# Patient Record
Sex: FEMALE | Race: WHITE | NOT HISPANIC OR LATINO
[De-identification: names, ages, dates, MRNs, and addresses within clinical notes are randomized per-mention and may not be internally consistent; named-entity substitution may affect disease eponyms.]

---

## 2021-06-07 ENCOUNTER — APPOINTMENT (OUTPATIENT)
Dept: OBGYN | Facility: HOSPITAL | Age: 86
End: 2021-06-07

## 2021-06-07 ENCOUNTER — APPOINTMENT (OUTPATIENT)
Dept: SURGERY | Facility: HOSPITAL | Age: 86
End: 2021-06-07

## 2021-07-13 ENCOUNTER — NON-APPOINTMENT (OUTPATIENT)
Age: 86
End: 2021-07-13

## 2021-07-13 ENCOUNTER — APPOINTMENT (OUTPATIENT)
Dept: OPHTHALMOLOGY | Facility: CLINIC | Age: 86
End: 2021-07-13
Payer: MEDICARE

## 2021-07-13 PROCEDURE — 99072 ADDL SUPL MATRL&STAF TM PHE: CPT

## 2021-07-13 PROCEDURE — 76514 ECHO EXAM OF EYE THICKNESS: CPT

## 2021-07-13 PROCEDURE — 92004 COMPRE OPH EXAM NEW PT 1/>: CPT

## 2021-07-13 PROCEDURE — 92020 GONIOSCOPY: CPT

## 2021-09-03 ENCOUNTER — TRANSCRIPTION ENCOUNTER (OUTPATIENT)
Age: 86
End: 2021-09-03

## 2021-10-19 ENCOUNTER — APPOINTMENT (OUTPATIENT)
Dept: OPHTHALMOLOGY | Facility: CLINIC | Age: 86
End: 2021-10-19
Payer: MEDICARE

## 2021-10-19 ENCOUNTER — NON-APPOINTMENT (OUTPATIENT)
Age: 86
End: 2021-10-19

## 2021-10-19 PROCEDURE — 92014 COMPRE OPH EXAM EST PT 1/>: CPT

## 2022-01-28 ENCOUNTER — NON-APPOINTMENT (OUTPATIENT)
Age: 87
End: 2022-01-28

## 2022-01-28 ENCOUNTER — APPOINTMENT (OUTPATIENT)
Dept: OPHTHALMOLOGY | Facility: CLINIC | Age: 87
End: 2022-01-28
Payer: MEDICARE

## 2022-01-28 PROCEDURE — 92012 INTRM OPH EXAM EST PATIENT: CPT

## 2022-03-25 ENCOUNTER — NON-APPOINTMENT (OUTPATIENT)
Age: 87
End: 2022-03-25

## 2022-03-25 ENCOUNTER — APPOINTMENT (OUTPATIENT)
Dept: OPHTHALMOLOGY | Facility: CLINIC | Age: 87
End: 2022-03-25
Payer: MEDICARE

## 2022-03-25 PROCEDURE — 92012 INTRM OPH EXAM EST PATIENT: CPT

## 2022-05-04 ENCOUNTER — APPOINTMENT (OUTPATIENT)
Dept: OPHTHALMOLOGY | Facility: CLINIC | Age: 87
End: 2022-05-04
Payer: MEDICARE

## 2022-05-04 ENCOUNTER — NON-APPOINTMENT (OUTPATIENT)
Age: 87
End: 2022-05-04

## 2022-05-04 PROCEDURE — 92012 INTRM OPH EXAM EST PATIENT: CPT

## 2022-07-08 ENCOUNTER — APPOINTMENT (OUTPATIENT)
Dept: OPHTHALMOLOGY | Facility: CLINIC | Age: 87
End: 2022-07-08

## 2022-10-11 ENCOUNTER — APPOINTMENT (OUTPATIENT)
Dept: OPHTHALMOLOGY | Facility: CLINIC | Age: 87
End: 2022-10-11

## 2022-11-22 PROBLEM — Z00.00 ENCOUNTER FOR PREVENTIVE HEALTH EXAMINATION: Status: ACTIVE | Noted: 2022-11-22

## 2022-11-29 ENCOUNTER — APPOINTMENT (OUTPATIENT)
Dept: OPHTHALMOLOGY | Facility: CLINIC | Age: 87
End: 2022-11-29

## 2022-11-29 ENCOUNTER — NON-APPOINTMENT (OUTPATIENT)
Age: 87
End: 2022-11-29

## 2022-11-29 PROCEDURE — 92012 INTRM OPH EXAM EST PATIENT: CPT

## 2023-01-24 ENCOUNTER — NON-APPOINTMENT (OUTPATIENT)
Age: 88
End: 2023-01-24

## 2023-01-24 ENCOUNTER — APPOINTMENT (OUTPATIENT)
Dept: OPHTHALMOLOGY | Facility: CLINIC | Age: 88
End: 2023-01-24
Payer: MEDICARE

## 2023-01-24 PROCEDURE — 92012 INTRM OPH EXAM EST PATIENT: CPT

## 2023-06-06 ENCOUNTER — NON-APPOINTMENT (OUTPATIENT)
Age: 88
End: 2023-06-06

## 2023-06-06 ENCOUNTER — APPOINTMENT (OUTPATIENT)
Dept: OPHTHALMOLOGY | Facility: CLINIC | Age: 88
End: 2023-06-06
Payer: MEDICARE

## 2023-06-06 PROCEDURE — 92012 INTRM OPH EXAM EST PATIENT: CPT

## 2023-12-05 ENCOUNTER — NON-APPOINTMENT (OUTPATIENT)
Age: 88
End: 2023-12-05

## 2023-12-05 ENCOUNTER — APPOINTMENT (OUTPATIENT)
Dept: OPHTHALMOLOGY | Facility: CLINIC | Age: 88
End: 2023-12-05
Payer: MEDICARE

## 2023-12-05 PROCEDURE — 92012 INTRM OPH EXAM EST PATIENT: CPT

## 2024-06-04 ENCOUNTER — NON-APPOINTMENT (OUTPATIENT)
Age: 89
End: 2024-06-04

## 2024-06-04 ENCOUNTER — APPOINTMENT (OUTPATIENT)
Dept: OPHTHALMOLOGY | Facility: CLINIC | Age: 89
End: 2024-06-04
Payer: MEDICARE

## 2024-06-04 PROCEDURE — 92014 COMPRE OPH EXAM EST PT 1/>: CPT

## 2025-01-14 ENCOUNTER — APPOINTMENT (OUTPATIENT)
Dept: OPHTHALMOLOGY | Facility: CLINIC | Age: 89
End: 2025-01-14

## 2025-02-12 ENCOUNTER — APPOINTMENT (OUTPATIENT)
Dept: OPHTHALMOLOGY | Facility: CLINIC | Age: 89
End: 2025-02-12

## 2025-05-02 ENCOUNTER — INPATIENT (INPATIENT)
Facility: HOSPITAL | Age: 89
LOS: 1 days | Discharge: ANOTHER HOSPITAL | DRG: 534 | End: 2025-05-04
Attending: STUDENT IN AN ORGANIZED HEALTH CARE EDUCATION/TRAINING PROGRAM | Admitting: STUDENT IN AN ORGANIZED HEALTH CARE EDUCATION/TRAINING PROGRAM
Payer: MEDICARE

## 2025-05-02 VITALS
DIASTOLIC BLOOD PRESSURE: 60 MMHG | OXYGEN SATURATION: 98 % | TEMPERATURE: 98 F | HEIGHT: 66 IN | RESPIRATION RATE: 17 BRPM | HEART RATE: 60 BPM | WEIGHT: 119.93 LBS | SYSTOLIC BLOOD PRESSURE: 148 MMHG

## 2025-05-02 DIAGNOSIS — I10 ESSENTIAL (PRIMARY) HYPERTENSION: ICD-10-CM

## 2025-05-02 DIAGNOSIS — S72.492A OTHER FRACTURE OF LOWER END OF LEFT FEMUR, INITIAL ENCOUNTER FOR CLOSED FRACTURE: ICD-10-CM

## 2025-05-02 DIAGNOSIS — S72.92XA UNSPECIFIED FRACTURE OF LEFT FEMUR, INITIAL ENCOUNTER FOR CLOSED FRACTURE: ICD-10-CM

## 2025-05-02 DIAGNOSIS — Z87.81 PERSONAL HISTORY OF (HEALED) TRAUMATIC FRACTURE: Chronic | ICD-10-CM

## 2025-05-02 DIAGNOSIS — Z29.9 ENCOUNTER FOR PROPHYLACTIC MEASURES, UNSPECIFIED: ICD-10-CM

## 2025-05-02 DIAGNOSIS — I48.92 UNSPECIFIED ATRIAL FLUTTER: ICD-10-CM

## 2025-05-02 DIAGNOSIS — H40.9 UNSPECIFIED GLAUCOMA: ICD-10-CM

## 2025-05-02 LAB
ALBUMIN SERPL ELPH-MCNC: 3.1 G/DL — LOW (ref 3.3–5)
ALP SERPL-CCNC: 66 U/L — SIGNIFICANT CHANGE UP (ref 40–120)
ALT FLD-CCNC: 20 U/L — SIGNIFICANT CHANGE UP (ref 12–78)
ANION GAP SERPL CALC-SCNC: 9 MMOL/L — SIGNIFICANT CHANGE UP (ref 5–17)
APTT BLD: 33.4 SEC — SIGNIFICANT CHANGE UP (ref 26.1–36.8)
AST SERPL-CCNC: 13 U/L — LOW (ref 15–37)
BASOPHILS # BLD AUTO: 0.03 K/UL — SIGNIFICANT CHANGE UP (ref 0–0.2)
BASOPHILS NFR BLD AUTO: 0.2 % — SIGNIFICANT CHANGE UP (ref 0–2)
BILIRUB SERPL-MCNC: 1.3 MG/DL — HIGH (ref 0.2–1.2)
BUN SERPL-MCNC: 18 MG/DL — SIGNIFICANT CHANGE UP (ref 7–23)
CALCIUM SERPL-MCNC: 9.1 MG/DL — SIGNIFICANT CHANGE UP (ref 8.5–10.1)
CHLORIDE SERPL-SCNC: 109 MMOL/L — HIGH (ref 96–108)
CO2 SERPL-SCNC: 23 MMOL/L — SIGNIFICANT CHANGE UP (ref 22–31)
CREAT SERPL-MCNC: 0.87 MG/DL — SIGNIFICANT CHANGE UP (ref 0.5–1.3)
EGFR: 64 ML/MIN/1.73M2 — SIGNIFICANT CHANGE UP
EGFR: 64 ML/MIN/1.73M2 — SIGNIFICANT CHANGE UP
EOSINOPHIL # BLD AUTO: 0.01 K/UL — SIGNIFICANT CHANGE UP (ref 0–0.5)
EOSINOPHIL NFR BLD AUTO: 0.1 % — SIGNIFICANT CHANGE UP (ref 0–6)
GLUCOSE SERPL-MCNC: 127 MG/DL — HIGH (ref 70–99)
HCT VFR BLD CALC: 39.4 % — SIGNIFICANT CHANGE UP (ref 34.5–45)
HGB BLD-MCNC: 13.1 G/DL — SIGNIFICANT CHANGE UP (ref 11.5–15.5)
IMM GRANULOCYTES NFR BLD AUTO: 0.7 % — SIGNIFICANT CHANGE UP (ref 0–0.9)
INR BLD: 1.28 RATIO — HIGH (ref 0.85–1.16)
LYMPHOCYTES # BLD AUTO: 0.87 K/UL — LOW (ref 1–3.3)
LYMPHOCYTES # BLD AUTO: 6.3 % — LOW (ref 13–44)
MCHC RBC-ENTMCNC: 30.8 PG — SIGNIFICANT CHANGE UP (ref 27–34)
MCHC RBC-ENTMCNC: 33.2 G/DL — SIGNIFICANT CHANGE UP (ref 32–36)
MCV RBC AUTO: 92.5 FL — SIGNIFICANT CHANGE UP (ref 80–100)
MONOCYTES # BLD AUTO: 0.96 K/UL — HIGH (ref 0–0.9)
MONOCYTES NFR BLD AUTO: 6.9 % — SIGNIFICANT CHANGE UP (ref 2–14)
NEUTROPHILS # BLD AUTO: 11.88 K/UL — HIGH (ref 1.8–7.4)
NEUTROPHILS NFR BLD AUTO: 85.8 % — HIGH (ref 43–77)
NRBC BLD AUTO-RTO: 0 /100 WBCS — SIGNIFICANT CHANGE UP (ref 0–0)
PLATELET # BLD AUTO: 182 K/UL — SIGNIFICANT CHANGE UP (ref 150–400)
POTASSIUM SERPL-MCNC: 3.6 MMOL/L — SIGNIFICANT CHANGE UP (ref 3.5–5.3)
POTASSIUM SERPL-SCNC: 3.6 MMOL/L — SIGNIFICANT CHANGE UP (ref 3.5–5.3)
PROT SERPL-MCNC: 6.4 G/DL — SIGNIFICANT CHANGE UP (ref 6–8.3)
PROTHROM AB SERPL-ACNC: 15.1 SEC — HIGH (ref 9.9–13.4)
RBC # BLD: 4.26 M/UL — SIGNIFICANT CHANGE UP (ref 3.8–5.2)
RBC # FLD: 13.4 % — SIGNIFICANT CHANGE UP (ref 10.3–14.5)
SODIUM SERPL-SCNC: 141 MMOL/L — SIGNIFICANT CHANGE UP (ref 135–145)
WBC # BLD: 13.84 K/UL — HIGH (ref 3.8–10.5)
WBC # FLD AUTO: 13.84 K/UL — HIGH (ref 3.8–10.5)

## 2025-05-02 PROCEDURE — 73502 X-RAY EXAM HIP UNI 2-3 VIEWS: CPT | Mod: 26,LT

## 2025-05-02 PROCEDURE — 73552 X-RAY EXAM OF FEMUR 2/>: CPT | Mod: 26,LT

## 2025-05-02 PROCEDURE — 76376 3D RENDER W/INTRP POSTPROCES: CPT | Mod: 26

## 2025-05-02 PROCEDURE — 72125 CT NECK SPINE W/O DYE: CPT | Mod: 26

## 2025-05-02 PROCEDURE — 99285 EMERGENCY DEPT VISIT HI MDM: CPT | Mod: FS

## 2025-05-02 PROCEDURE — 71045 X-RAY EXAM CHEST 1 VIEW: CPT | Mod: 26

## 2025-05-02 PROCEDURE — 73700 CT LOWER EXTREMITY W/O DYE: CPT | Mod: 26,LT

## 2025-05-02 PROCEDURE — 73562 X-RAY EXAM OF KNEE 3: CPT | Mod: 26,LT

## 2025-05-02 PROCEDURE — 70450 CT HEAD/BRAIN W/O DYE: CPT | Mod: 26

## 2025-05-02 RX ORDER — ACETAMINOPHEN 500 MG/5ML
1000 LIQUID (ML) ORAL ONCE
Refills: 0 | Status: COMPLETED | OUTPATIENT
Start: 2025-05-02 | End: 2025-05-02

## 2025-05-02 RX ORDER — HYDROMORPHONE/SOD CHLOR,ISO/PF 2 MG/10 ML
0.2 SYRINGE (ML) INJECTION EVERY 4 HOURS
Refills: 0 | Status: DISCONTINUED | OUTPATIENT
Start: 2025-05-02 | End: 2025-05-04

## 2025-05-02 RX ORDER — HEPARIN SODIUM 1000 [USP'U]/ML
5000 INJECTION INTRAVENOUS; SUBCUTANEOUS ONCE
Refills: 0 | Status: DISCONTINUED | OUTPATIENT
Start: 2025-05-02 | End: 2025-05-02

## 2025-05-02 RX ORDER — LISINOPRIL 5 MG/1
10 TABLET ORAL DAILY
Refills: 0 | Status: DISCONTINUED | OUTPATIENT
Start: 2025-05-02 | End: 2025-05-04

## 2025-05-02 RX ORDER — ACETAMINOPHEN 500 MG/5ML
1000 LIQUID (ML) ORAL ONCE
Refills: 0 | Status: COMPLETED | OUTPATIENT
Start: 2025-05-03 | End: 2025-05-03

## 2025-05-02 RX ORDER — MELATONIN 5 MG
3 TABLET ORAL AT BEDTIME
Refills: 0 | Status: DISCONTINUED | OUTPATIENT
Start: 2025-05-02 | End: 2025-05-04

## 2025-05-02 RX ORDER — HYDROMORPHONE/SOD CHLOR,ISO/PF 2 MG/10 ML
0.5 SYRINGE (ML) INJECTION EVERY 4 HOURS
Refills: 0 | Status: DISCONTINUED | OUTPATIENT
Start: 2025-05-02 | End: 2025-05-04

## 2025-05-02 RX ORDER — POLYETHYLENE GLYCOL 3350 17 G/17G
17 POWDER, FOR SOLUTION ORAL DAILY
Refills: 0 | Status: DISCONTINUED | OUTPATIENT
Start: 2025-05-02 | End: 2025-05-04

## 2025-05-02 RX ORDER — ONDANSETRON HCL/PF 4 MG/2 ML
4 VIAL (ML) INJECTION EVERY 8 HOURS
Refills: 0 | Status: DISCONTINUED | OUTPATIENT
Start: 2025-05-02 | End: 2025-05-04

## 2025-05-02 RX ORDER — MAGNESIUM, ALUMINUM HYDROXIDE 200-200 MG
30 TABLET,CHEWABLE ORAL EVERY 4 HOURS
Refills: 0 | Status: DISCONTINUED | OUTPATIENT
Start: 2025-05-02 | End: 2025-05-04

## 2025-05-02 RX ORDER — HYDROMORPHONE/SOD CHLOR,ISO/PF 2 MG/10 ML
0.5 SYRINGE (ML) INJECTION ONCE
Refills: 0 | Status: DISCONTINUED | OUTPATIENT
Start: 2025-05-02 | End: 2025-05-02

## 2025-05-02 RX ORDER — NEBIVOLOL 2.5 MG/1
2.5 TABLET ORAL DAILY
Refills: 0 | Status: DISCONTINUED | OUTPATIENT
Start: 2025-05-03 | End: 2025-05-04

## 2025-05-02 RX ORDER — TIMOLOL MALEATE 6.8 MG/ML
1 SOLUTION OPHTHALMIC
Refills: 0 | Status: DISCONTINUED | OUTPATIENT
Start: 2025-05-02 | End: 2025-05-04

## 2025-05-02 RX ORDER — BRIMONIDINE TARTRATE 1.5 MG/ML
1 SOLUTION/ DROPS OPHTHALMIC
Refills: 0 | Status: DISCONTINUED | OUTPATIENT
Start: 2025-05-02 | End: 2025-05-04

## 2025-05-02 RX ORDER — ACETAMINOPHEN 500 MG/5ML
650 LIQUID (ML) ORAL EVERY 6 HOURS
Refills: 0 | Status: DISCONTINUED | OUTPATIENT
Start: 2025-05-02 | End: 2025-05-02

## 2025-05-02 RX ORDER — SENNA 187 MG
2 TABLET ORAL AT BEDTIME
Refills: 0 | Status: DISCONTINUED | OUTPATIENT
Start: 2025-05-02 | End: 2025-05-04

## 2025-05-02 RX ORDER — ONDANSETRON HCL/PF 4 MG/2 ML
4 VIAL (ML) INJECTION ONCE
Refills: 0 | Status: COMPLETED | OUTPATIENT
Start: 2025-05-02 | End: 2025-05-02

## 2025-05-02 RX ADMIN — Medication 0.5 MILLIGRAM(S): at 15:32

## 2025-05-02 RX ADMIN — BRIMONIDINE TARTRATE 1 DROP(S): 1.5 SOLUTION/ DROPS OPHTHALMIC at 22:01

## 2025-05-02 RX ADMIN — Medication 0.5 MILLIGRAM(S): at 18:22

## 2025-05-02 RX ADMIN — Medication 4 MILLIGRAM(S): at 21:22

## 2025-05-02 RX ADMIN — Medication 100 MILLILITER(S): at 18:23

## 2025-05-02 RX ADMIN — Medication 400 MILLIGRAM(S): at 22:01

## 2025-05-02 RX ADMIN — TIMOLOL MALEATE 1 DROP(S): 6.8 SOLUTION OPHTHALMIC at 22:01

## 2025-05-02 RX ADMIN — Medication 4 MILLIGRAM(S): at 18:23

## 2025-05-02 NOTE — H&P ADULT - NSHPPHYSICALEXAM_GEN_ALL_CORE
T(C): 36.6 (05-02-25 @ 14:26), Max: 36.6 (05-02-25 @ 14:26)  HR: 60 (05-02-25 @ 14:26) (60 - 60)  BP: 148/60 (05-02-25 @ 14:26) (148/60 - 148/60)  RR: 17 (05-02-25 @ 14:26) (17 - 17)  SpO2: 98% (05-02-25 @ 14:26) (98% - 98%)    GENERAL: mild distress due to pain  HEENT: NCAT, EOMI  LUNGS: clear to auscultation, symmetric breath sounds, no wheezing or rhonchi appreciated  HEART: S1/S2, regular rate and rhythm, systolic murmur, no lower extremity edema  GASTROINTESTINAL: abdomen is soft, nontender, nondistended, normoactive bowel sounds  INTEGUMENT: warm skin, appears well perfused  MUSCULOSKELETAL: +brace and ace wrap in place on LLE  NEUROLOGIC: awake and alert, answering questions and following commands appropriately  HEME/LYMPH: no obvious ecchymosis or petechiae

## 2025-05-02 NOTE — H&P ADULT - NSICDXFAMILYHX_GEN_ALL_CORE_FT
FAMILY HISTORY:  Father  Still living? Unknown  FHx: colon cancer, Age at diagnosis: Age Unknown

## 2025-05-02 NOTE — H&P ADULT - HISTORY OF PRESENT ILLNESS
Pt is a 89y/o with PMHx blindness, glaucoma, HTN, atrial flutter and hx PE on eliquis who presents to the ED with L leg pain s/p mechanical fall at home. Pt slipped and fell in the bathroom approx 10am this morning and landed on her left side. She did not feel dizzy prior, did not lose consciousness and did not hit her head. She was unable to ambulate and noted significant pain in her leg, prompting EMS to be called. Pt currently is still c/o pain although improved s/p IV pain medication. Pt also with nausea that is improved s/p Zofran. She denies any other symtpoms including recent illness, fever, chills, chest pain, shortness of breath, [place], abdominal pain, diarrhea, constipation.    ED Course:   Vitals: BP: 148/60, HR: 60, Temp: 97.8, RR: 17, SpO2: 98% on RA  Labs: 13.84 WBC, PT/INR 15.1/1.28  CXR: Hyperinflated lungs. No active infiltrates  CT HEAD: Possible right globe retinal detachment. Recommend ophthalmologic consultation. No acute intracranial hemorrhage, mass effect, or midline shift. Chronic findings as above.  CT CERVICAL SPINE: No acute fracture or traumatic subluxation. Multi-level degenerative changes.  XR L hip w/pelvis: Status post ORIF left hip. Cortical irregularity left superior inferior pubic rami medially may reflect old trauma. Acute fracture not excluded. Recommend CT as clinically indicated. Sacroiliac joints intact. Degenerative change bilateral hips greater on the right. Diffuse demineralization Nonspecific pelvic calcifications. Curvilinear vascular calcification in the lower abdomen may reflect ectasia, consider CT abdomen as warranted  XR L knee/femur: Acute comminuted displaced supracondylar fracture  of the left femur. Demineralization. Mild degenerative change. Soft tissue swelling.  CT L femur non con: Acute impacted displaced intra-articular fracture of the distal femur with large lipohemarthrosis at the knee. Findings of an underlying possible lesion are present at the site of fracture, with additional findings of abnormal rounded foci of soft tissue within the marrow, suggesting the possibility of underlying metastatic disease. Strongly   advise further evaluation with femur MRI.  EKG: pending  Received in the ED: dilaudid 0.5mg IV x2, Zofran, ofirmev x1

## 2025-05-02 NOTE — ED PROVIDER NOTE - MUSCULOSKELETAL, MLM
moving arms well, no midline tenderness; LLE rotated inwards, somewhat limited exam due to intense pain with any movement or with palpation of upper thigh/knee area, +NVI, scar noted to thigh

## 2025-05-02 NOTE — ED ADULT TRIAGE NOTE - CHIEF COMPLAINT QUOTE
biba ([place]).  from home, witnessed fall (normally ambulates with walker) mechanical trip / fall, complaining of pain to left thigh.  unable to ambulate after.  no obv shortening / rotation, but swelling visible to left thigh.  no known head injury, no loc, takes Eliquis.

## 2025-05-02 NOTE — H&P ADULT - PROBLEM SELECTOR PLAN 4
CT HEAD: Possible right globe retinal detachment. Recommend ophthalmologic consultation. No acute intracranial hemorrhage, mass effect, or midline shift. Chronic findings as above.  - pt with blindess follows with ophtho outpatient - hx retinal detachment per outpatient notes  - continue home eye drops  - pt requires assistance with eating

## 2025-05-02 NOTE — H&P ADULT - NSHPSOCIALHISTORY_GEN_ALL_CORE
Lives: with daughter and CHAI  ADLs: ambulates with walker, has help from family with ADLs  Diet: no restrictions  Alcohol Use: denies  Tobacco Use: denies  Recreational Drug Use: denies

## 2025-05-02 NOTE — H&P ADULT - PROBLEM SELECTOR PLAN 1
Pt presenting with LLE pain s/p mechanical fall at home  - VSS on arrival  - labs showing leukocytosis likely reactive  - XR showing acute comminuted displaced supracondylar fracture of the left femur  - CT L femur non-con showing acute impacted displaced intra-articular fracture of the distal femur with large lipohemarthrosis at the knee. Findings of an underlying possible lesion are present at the site of fracture, with additional findings of abnormal rounded foci of soft tissue within the marrow, suggesting the possibility of underlying metastatic disease. Strongly advise further evaluation with femur MRI.  - s/p dilaudid 0.5mg IV x2, Zofran, ofirmev x1 in ED  - ortho consulted in ED, patient is planned for possible surgical intervention Sunday pending optimization and findings on CT  - ortho team to f/u regarding MR femur tomorrow AM  - pt on eliquis 2.5mg BID for atrial flutter and hx PE will start heparin gtt for AC with planned procedure  - pain control with IV tylenol x3 doses standing, dilaudid 0.2mg q4h for moderate, dilaudid 0.5mg for severe  - zofran PRN for nausea, f/u EKG for qtc  - bowel regimen  - Pt has no active ischemia, decompensated heart failure, or unstable arrythmia; she has no history of kidney disease and no prior anesthesia reactions  - RCRI 0, class 1 risk  - medical optimization pending EKG and cardiac optimization  - systolic murmur on auscultation f/u tte  - cardio consult

## 2025-05-02 NOTE — ED ADULT NURSE NOTE - OBJECTIVE STATEMENT
Pt presents to the ED c/o witnessed fall at home. Pt is blind. - head strike, -LOC, + blood thinners. IV established in left arm with a 20G BY EMS, labs drawn and sent, call bell in reach, warm blanket provided, bed in lowest position, side rails up x2, MD evaluation in progress. Pt in severe pain, in acute distress. Obvious deformity noted on L leg. No other injuries/ deformities.

## 2025-05-02 NOTE — ED ADULT NURSE NOTE - NSFALLHARMRISKINTERV_ED_ALL_ED

## 2025-05-02 NOTE — H&P ADULT - ATTENDING COMMENTS
88y/o with PMHx blindness, glaucoma, HTN, atrial flutter and hx PE on eliquis who presents to the ED with L leg pain s/p mechanical fall at home. Pt slipped and fell in the bathroom approx 10am this morning and landed on her left side- Admitted with Left Femur Fracture    T(C): 36.8 (05-02-25 @ 23:07), Max: 36.8 (05-02-25 @ 23:07)  HR: 74 (05-02-25 @ 23:07) (60 - 80)  BP: 161/84 (05-02-25 @ 23:07) (148/60 - 161/84)  RR: 18 (05-02-25 @ 23:07) (17 - 18)  SpO2: 99% (05-02-25 @ 23:07) (96% - 99%)    Reviewed labs and imaging   CT Femur shows Acute impacted displaced intra-articular fracture of the distal femur   with large lipohemarthrosis at the knee. Findings of an underlying   possible lesion are present at the site of fracture, with additional   findings of abnormal rounded foci of soft tissue within the marrow,   suggesting the possibility of underlying metastatic disease.     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

## 2025-05-02 NOTE — H&P ADULT - ASSESSMENT
Pt is a 89y/o with PMHx blindness, glaucoma, HTN, atrial flutter and hx PE on eliquis who presents to the ED with L leg pain s/p mechanical fall at home, admitted with L femur fracture.           Pt is a 89y/o with PMHx blindness, glaucoma, HTN, atrial flutter and hx PE on eliquis who presents to the ED with L leg pain s/p mechanical fall at home, admitted with L femur fracture.

## 2025-05-02 NOTE — ED PROVIDER NOTE - CLINICAL SUMMARY MEDICAL DECISION MAKING FREE TEXT BOX
Patient is an 89-year-old female on Eliquis, with visual impairment secondary to macular degeneration.  Status post ORIF remotely.  Who had a mechanical fall while in the bathroom witnessed by her family member with whom she lives today.  Developed sudden severe pain of her left femur and hip.  Unable to ambulate.  Brought in by ambulance given Zofran prior to arrival for severe pain and nausea.  Patient has deformity to her left knee with ecchymosis.  X-rays demonstrated here in the emergency room distal femur fracture into the joint.  With an intact hip and proximal femoral swetha.  Orthopedics were consulted.  Patient was placed in a knee immobilizer with extension, and there is distraction of the fracture.  Patient is uncomfortable at this time.  She denies striking her head.  She complains of mild chest wall discomfort.  She has no shortness of breath.    On evaluation is an elderly female who is blind, holding an emesis bag status post receiving narcotic pain medication awake and alert and oriented.  HEENT shows no evidence of trauma.  Neck is supple.  Mucous members are moist.  Cardiopulmonary examination is otherwise unremarkable.  Except for a systolic murmur.  Abdomen is soft and nontender without guarding or rebound.  Left lower extremity is neurologic and vascular intact with a bulky Galvan over the knee and a knee immobilizer.  This was placed by orthopedic service.  Right lower extremity is intact.    Plan of care were Ortho care, trauma consult, laboratory studies pain management disposition accordingly patient to be admitted to either this facility or trauma.  Depending on these of orthopedics.

## 2025-05-02 NOTE — ED ADULT NURSE NOTE - CHIEF COMPLAINT QUOTE
biba ([place]).  from home, witnessed fall (normally ambulates with walker) mechanical trip / fall, complaining of pain to left thigh.  unable to ambulate after.  no obv shortening / rotation, but swelling visible to left thigh.  no known head injury, no loc, takes Eliquis.

## 2025-05-02 NOTE — H&P ADULT - PROBLEM SELECTOR PLAN 2
PT/INR 15.1/1.28 on arrival  - hold home eliquis for planned procedure  - will start heparin gtt 12 hours s/p her last dose of eliquis  - continue home nebivolol with hold parameters  - f/u EKG  - cardio consult

## 2025-05-02 NOTE — ED PROVIDER NOTE - OBJECTIVE STATEMENT
89 female history of blindness, hypertension, atrial flutter, HLD, ?DVT, on Eliquis, complaining of pain to left upper leg after mechanical fall today.  Walks with walker and slipped and fell, landing on left side.  Unknown head injury, no LOC.  Patient states she has a swetha in her left thigh (~5 or 6 years ago in [place]). Given IV tylenol and zofran by EMS.

## 2025-05-02 NOTE — H&P ADULT - NSHPREVIEWOFSYSTEMS_GEN_ALL_CORE
CONSTITUTIONAL: denies fever, chills  HEENT: denies blurred vision, sore throat  CARDIOVASCULAR: denies chest pain, chest pressure, palpitations  RESPIRATORY: denies shortness of breath, sputum production  GASTROINTESTINAL: +nausea, denies vomiting, abdominal pain, diarrhea, constipation  NEUROLOGICAL: denies numbness, headache, focal weakness  MUSCULOSKELETAL: +left leg pain  HEMATOLOGIC: denies gross bleeding, bruising

## 2025-05-03 ENCOUNTER — TRANSCRIPTION ENCOUNTER (OUTPATIENT)
Age: 89
End: 2025-05-03

## 2025-05-03 ENCOUNTER — RESULT REVIEW (OUTPATIENT)
Age: 89
End: 2025-05-03

## 2025-05-03 LAB
ALBUMIN SERPL ELPH-MCNC: 2.9 G/DL — LOW (ref 3.3–5)
ALP SERPL-CCNC: 61 U/L — SIGNIFICANT CHANGE UP (ref 40–120)
ALT FLD-CCNC: 20 U/L — SIGNIFICANT CHANGE UP (ref 12–78)
ANION GAP SERPL CALC-SCNC: 8 MMOL/L — SIGNIFICANT CHANGE UP (ref 5–17)
APTT BLD: > 200 SEC (ref 26.1–36.8)
AST SERPL-CCNC: 19 U/L — SIGNIFICANT CHANGE UP (ref 15–37)
BASOPHILS # BLD AUTO: 0.03 K/UL — SIGNIFICANT CHANGE UP (ref 0–0.2)
BASOPHILS NFR BLD AUTO: 0.3 % — SIGNIFICANT CHANGE UP (ref 0–2)
BILIRUB SERPL-MCNC: 1.7 MG/DL — HIGH (ref 0.2–1.2)
BUN SERPL-MCNC: 18 MG/DL — SIGNIFICANT CHANGE UP (ref 7–23)
CALCIUM SERPL-MCNC: 9.2 MG/DL — SIGNIFICANT CHANGE UP (ref 8.5–10.1)
CHLORIDE SERPL-SCNC: 114 MMOL/L — HIGH (ref 96–108)
CO2 SERPL-SCNC: 24 MMOL/L — SIGNIFICANT CHANGE UP (ref 22–31)
CREAT SERPL-MCNC: 0.87 MG/DL — SIGNIFICANT CHANGE UP (ref 0.5–1.3)
EGFR: 64 ML/MIN/1.73M2 — SIGNIFICANT CHANGE UP
EGFR: 64 ML/MIN/1.73M2 — SIGNIFICANT CHANGE UP
EOSINOPHIL # BLD AUTO: 0.13 K/UL — SIGNIFICANT CHANGE UP (ref 0–0.5)
EOSINOPHIL NFR BLD AUTO: 1.2 % — SIGNIFICANT CHANGE UP (ref 0–6)
GLUCOSE SERPL-MCNC: 105 MG/DL — HIGH (ref 70–99)
HCT VFR BLD CALC: 30.1 % — LOW (ref 34.5–45)
HCT VFR BLD CALC: 33.3 % — LOW (ref 34.5–45)
HGB BLD-MCNC: 11 G/DL — LOW (ref 11.5–15.5)
HGB BLD-MCNC: 9.8 G/DL — LOW (ref 11.5–15.5)
IMM GRANULOCYTES NFR BLD AUTO: 0.5 % — SIGNIFICANT CHANGE UP (ref 0–0.9)
LDH SERPL L TO P-CCNC: 245 U/L — HIGH (ref 50–242)
LYMPHOCYTES # BLD AUTO: 1.79 K/UL — SIGNIFICANT CHANGE UP (ref 1–3.3)
LYMPHOCYTES # BLD AUTO: 17 % — SIGNIFICANT CHANGE UP (ref 13–44)
MCHC RBC-ENTMCNC: 30.2 PG — SIGNIFICANT CHANGE UP (ref 27–34)
MCHC RBC-ENTMCNC: 30.9 PG — SIGNIFICANT CHANGE UP (ref 27–34)
MCHC RBC-ENTMCNC: 32.6 G/DL — SIGNIFICANT CHANGE UP (ref 32–36)
MCHC RBC-ENTMCNC: 33 G/DL — SIGNIFICANT CHANGE UP (ref 32–36)
MCV RBC AUTO: 92.9 FL — SIGNIFICANT CHANGE UP (ref 80–100)
MCV RBC AUTO: 93.5 FL — SIGNIFICANT CHANGE UP (ref 80–100)
MONOCYTES # BLD AUTO: 1.04 K/UL — HIGH (ref 0–0.9)
MONOCYTES NFR BLD AUTO: 9.9 % — SIGNIFICANT CHANGE UP (ref 2–14)
NEUTROPHILS # BLD AUTO: 7.5 K/UL — HIGH (ref 1.8–7.4)
NEUTROPHILS NFR BLD AUTO: 71.1 % — SIGNIFICANT CHANGE UP (ref 43–77)
NRBC BLD AUTO-RTO: 0 /100 WBCS — SIGNIFICANT CHANGE UP (ref 0–0)
NRBC BLD AUTO-RTO: 0 /100 WBCS — SIGNIFICANT CHANGE UP (ref 0–0)
PLATELET # BLD AUTO: 132 K/UL — LOW (ref 150–400)
PLATELET # BLD AUTO: 188 K/UL — SIGNIFICANT CHANGE UP (ref 150–400)
POTASSIUM SERPL-MCNC: 4.9 MMOL/L — SIGNIFICANT CHANGE UP (ref 3.5–5.3)
POTASSIUM SERPL-SCNC: 4.9 MMOL/L — SIGNIFICANT CHANGE UP (ref 3.5–5.3)
PROT SERPL-MCNC: 5.8 G/DL — LOW (ref 6–8.3)
PROT SERPL-MCNC: 5.9 G/DL — LOW (ref 6–8.3)
RBC # BLD: 3.24 M/UL — LOW (ref 3.8–5.2)
RBC # BLD: 3.56 M/UL — LOW (ref 3.8–5.2)
RBC # FLD: 13.5 % — SIGNIFICANT CHANGE UP (ref 10.3–14.5)
RBC # FLD: 13.6 % — SIGNIFICANT CHANGE UP (ref 10.3–14.5)
SODIUM SERPL-SCNC: 146 MMOL/L — HIGH (ref 135–145)
WBC # BLD: 10.54 K/UL — HIGH (ref 3.8–10.5)
WBC # BLD: 10.69 K/UL — HIGH (ref 3.8–10.5)
WBC # FLD AUTO: 10.54 K/UL — HIGH (ref 3.8–10.5)
WBC # FLD AUTO: 10.69 K/UL — HIGH (ref 3.8–10.5)

## 2025-05-03 PROCEDURE — 93010 ELECTROCARDIOGRAM REPORT: CPT

## 2025-05-03 PROCEDURE — 99222 1ST HOSP IP/OBS MODERATE 55: CPT

## 2025-05-03 PROCEDURE — 71250 CT THORAX DX C-: CPT | Mod: 26

## 2025-05-03 PROCEDURE — 73720 MRI LWR EXTREMITY W/O&W/DYE: CPT | Mod: 26,LT

## 2025-05-03 PROCEDURE — 93306 TTE W/DOPPLER COMPLETE: CPT | Mod: 26

## 2025-05-03 PROCEDURE — 74176 CT ABD & PELVIS W/O CONTRAST: CPT | Mod: 26

## 2025-05-03 PROCEDURE — 99233 SBSQ HOSP IP/OBS HIGH 50: CPT

## 2025-05-03 RX ORDER — HEPARIN SODIUM 1000 [USP'U]/ML
2000 INJECTION INTRAVENOUS; SUBCUTANEOUS EVERY 6 HOURS
Refills: 0 | Status: DISCONTINUED | OUTPATIENT
Start: 2025-05-03 | End: 2025-05-04

## 2025-05-03 RX ORDER — HEPARIN SODIUM 1000 [USP'U]/ML
4000 INJECTION INTRAVENOUS; SUBCUTANEOUS ONCE
Refills: 0 | Status: COMPLETED | OUTPATIENT
Start: 2025-05-03 | End: 2025-05-03

## 2025-05-03 RX ORDER — HEPARIN SODIUM 1000 [USP'U]/ML
INJECTION INTRAVENOUS; SUBCUTANEOUS
Qty: 25000 | Refills: 0 | Status: DISCONTINUED | OUTPATIENT
Start: 2025-05-03 | End: 2025-05-04

## 2025-05-03 RX ORDER — SODIUM CHLORIDE 9 G/1000ML
1000 INJECTION, SOLUTION INTRAVENOUS
Refills: 0 | Status: DISCONTINUED | OUTPATIENT
Start: 2025-05-03 | End: 2025-05-04

## 2025-05-03 RX ORDER — HEPARIN SODIUM 1000 [USP'U]/ML
4000 INJECTION INTRAVENOUS; SUBCUTANEOUS EVERY 6 HOURS
Refills: 0 | Status: DISCONTINUED | OUTPATIENT
Start: 2025-05-03 | End: 2025-05-04

## 2025-05-03 RX ADMIN — Medication 400 MILLIGRAM(S): at 06:59

## 2025-05-03 RX ADMIN — HEPARIN SODIUM 800 UNIT(S)/HR: 1000 INJECTION INTRAVENOUS; SUBCUTANEOUS at 18:19

## 2025-05-03 RX ADMIN — LISINOPRIL 10 MILLIGRAM(S): 5 TABLET ORAL at 06:38

## 2025-05-03 RX ADMIN — Medication 1000 MILLIGRAM(S): at 15:04

## 2025-05-03 RX ADMIN — HEPARIN SODIUM 800 UNIT(S)/HR: 1000 INJECTION INTRAVENOUS; SUBCUTANEOUS at 19:22

## 2025-05-03 RX ADMIN — Medication 400 MILLIGRAM(S): at 15:00

## 2025-05-03 RX ADMIN — HEPARIN SODIUM 4000 UNIT(S): 1000 INJECTION INTRAVENOUS; SUBCUTANEOUS at 09:24

## 2025-05-03 RX ADMIN — Medication 1000 MILLIGRAM(S): at 07:29

## 2025-05-03 RX ADMIN — TIMOLOL MALEATE 1 DROP(S): 6.8 SOLUTION OPHTHALMIC at 18:35

## 2025-05-03 RX ADMIN — HEPARIN SODIUM 1000 UNIT(S)/HR: 1000 INJECTION INTRAVENOUS; SUBCUTANEOUS at 09:20

## 2025-05-03 RX ADMIN — Medication 2 TABLET(S): at 21:19

## 2025-05-03 RX ADMIN — NEBIVOLOL 2.5 MILLIGRAM(S): 2.5 TABLET ORAL at 06:39

## 2025-05-03 RX ADMIN — HEPARIN SODIUM 0 UNIT(S)/HR: 1000 INJECTION INTRAVENOUS; SUBCUTANEOUS at 17:15

## 2025-05-03 RX ADMIN — BRIMONIDINE TARTRATE 1 DROP(S): 1.5 SOLUTION/ DROPS OPHTHALMIC at 18:35

## 2025-05-03 NOTE — CONSULT NOTE ADULT - ASSESSMENT
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 Fell in bathroom adm w extensive complicated fx left distal femur. Also 7,5cm partially calcified mass lat to right axilla  CT and MRI of left femur suspicious for pathological fx w abnormal bone/marrow findings at site of fx.  CT head, c/a/p aside from the lateral to right axilla mass no significant findings (nonspecific 15mm right renal hyperattuated lesion, left adenxal 1.3cm simple cyst. fatty atrophy left thigh w extensive edema/left thigh likely assoc w the complicated fx althgouh can't r/0 additional muscle/soft tissue pathology in region.  Labs essentialy normal CBC. CMP w sl incr of total Bili ?related to the extensive local trauma/hematoma    -needs tissue for diagnosis  -bone specimen at time of left femur surgery. Also consider biopsy of the right axilla region mass  -daugher reports being tx to [place] for the definitive surgery.    further Onc recommendations will be pending pathology findings  thank you, will follow w you

## 2025-05-03 NOTE — DISCHARGE NOTE PROVIDER - CARE PROVIDER_API CALL
Klaudia Cowan  Medical Oncology  [address] [facility], [address] [place] [id number]  Phone: [phone]  Fax: [phone]  Follow Up Time: 1 week

## 2025-05-03 NOTE — CONSULT NOTE ADULT - ASSESSMENT
Assessment:  89 yo F with PMHx of HTN, Blindness, Glaucoma, Atrial flutter and remote hx of PE on Eliquis who presents to the ED with L leg pain s/p mechanical fall at home, found to have Femur Fracture, consulted for cardiac clearance.     Plan:   - EKG: Sinus rhythm with PAC 78 bpm   - No prior EKG to compared.   - Atrial Flutter with associated incident of Pulmonary Embolism, compliant with Eliquis 2.5 mg BID, continue   - On home Nebivolol 2.5 mg qD for rate control, continue     - No meaningful evidence of volume overload.  - Systolic murmur appreciated on examination   - Per pt and Family, no history of CHF   - TTE pending     - Elevated blood pressure on admission likely 2/2 pain   - Continue home ACEi     - Monitor and replete lytes, keep K>4, Mg>2.  - Pt has no active ischemia, decompensated heart failure, unstable arrythmia, or severe stenotic valvular disease, and has minimal cardiac risk factors. Pt with METs >4 In the setting of intermediate risk Orthopedic Surgery, she is optimized from cardiovascular standpoint to proceed with planned procedure pending TTE.    - Other cardiovascular workup will depend on clinical course.  - All other workup per primary team.  - Will continue to follow.

## 2025-05-03 NOTE — DISCHARGE NOTE PROVIDER - NSDCFUSCHEDAPPT_GEN_ALL_CORE_FT
Efren Marcos  [facility]  [facility] [address] [facility]  Scheduled Appointment: 05/13/2025

## 2025-05-03 NOTE — PROGRESS NOTE ADULT - SUBJECTIVE AND OBJECTIVE BOX
Patient seen and examined at bedside. No overnight issues. Pain well controlled in BJKI. Patient had nausea yesterday she attributes to not eating which has improved this morning. Denies numbness, tingling or weakness. Denies nausea, vomiting, chest pain, SOB, headache.       VITAL SIGNS:  T(C): 36.4 (05-03-25 @ 06:24), Max: 36.8 (05-02-25 @ 23:07)  HR: 87 (05-03-25 @ 06:24) (60 - 87)  BP: 147/76 (05-03-25 @ 06:24) (147/76 - 161/84)  RR: 18 (05-03-25 @ 06:24) (17 - 18)  SpO2: 96% (05-03-25 @ 06:24) (96% - 99%)    PHYSICAL EXAMINATION  Gen: NAD, Resting comfortably    LLE:  BJKI in place   TTP by distal femur and knee area, nowhere else along RLE  Motor: + EHL/FHL/TA/GSC  Sensory: +SILT: SPN/DPN/JUAN CARLOS/SAPH/TIB  Compartments soft and compressible  No calf tenderness  Dopplerable DP pulse, + PT  LABS:                        13.1   13.84 )-----------( 182      ( 02 May 2025 16:11 )             39.4     05-02    141  |  109[H]  |  18  ----------------------------<  127[H]  3.6   |  23  |  0.87    Ca    9.1      02 May 2025 16:11    TPro  6.4  /  Alb  3.1[L]  /  TBili  1.3[H]  /  DBili  x   /  AST  13[L]  /  ALT  20  /  AlkPhos  66  05-02    PT/INR - ( 02 May 2025 16:11 )   PT: 15.1 sec;   INR: 1.28 ratio         PTT - ( 02 May 2025 16:11 )  PTT:33.4 sec  Urinalysis Basic - ( 02 May 2025 16:11 )    Color: x / Appearance: x / SG: x / pH: x  Gluc: 127 mg/dL / Ketone: x  / Bili: x / Urobili: x   Blood: x / Protein: x / Nitrite: x   Leuk Esterase: x / RBC: x / WBC x   Sq Epi: x / Non Sq Epi: x / Bacteria: x    A/P: 89yFemale with left distal femur fracture and possible bony metastasis    NWB LLE IN BJKI at all times, strict bed rest  Plan for operative fixation of distal femur fracture possibly tomorrow 5/4/25 pending further evaluation of possible malignancy  FU CT CAP, FU MR L Femur w/wo IV contrast for concern of metastatic lesions  NPO after midnight, IVF while NPO  DVT PPX with Heparin, will hold DVT chemoprophylaxis after midnight the day prior to surgery  Appreciate documentation of medical and cardiac optimization   Pain Control As Needed  Ice hip as tolerated  Finalization of plan pending further discussion with Dr Groves Patient seen and examined at bedside. No overnight issues. Pain well controlled in BJKI. Patient had nausea yesterday she attributes to not eating which has improved this morning. Denies numbness, tingling or weakness. Denies nausea, vomiting, chest pain, SOB, headache.       VITAL SIGNS:  T(C): 36.4 (05-03-25 @ 06:24), Max: 36.8 (05-02-25 @ 23:07)  HR: 87 (05-03-25 @ 06:24) (60 - 87)  BP: 147/76 (05-03-25 @ 06:24) (147/76 - 161/84)  RR: 18 (05-03-25 @ 06:24) (17 - 18)  SpO2: 96% (05-03-25 @ 06:24) (96% - 99%)    PHYSICAL EXAMINATION  Gen: NAD, Resting comfortably    LLE:  BJKI in place   TTP by distal femur and knee area, nowhere else along RLE  Motor: + EHL/FHL/TA/GSC  Sensory: +SILT: SPN/DPN/JUAN CARLOS/SAPH/TIB  Compartments soft and compressible  No calf tenderness  Dopplerable DP pulse, + PT  LABS:                        13.1   13.84 )-----------( 182      ( 02 May 2025 16:11 )             39.4     05-02    141  |  109[H]  |  18  ----------------------------<  127[H]  3.6   |  23  |  0.87    Ca    9.1      02 May 2025 16:11    TPro  6.4  /  Alb  3.1[L]  /  TBili  1.3[H]  /  DBili  x   /  AST  13[L]  /  ALT  20  /  AlkPhos  66  05-02    PT/INR - ( 02 May 2025 16:11 )   PT: 15.1 sec;   INR: 1.28 ratio         PTT - ( 02 May 2025 16:11 )  PTT:33.4 sec  Urinalysis Basic - ( 02 May 2025 16:11 )    Color: x / Appearance: x / SG: x / pH: x  Gluc: 127 mg/dL / Ketone: x  / Bili: x / Urobili: x   Blood: x / Protein: x / Nitrite: x   Leuk Esterase: x / RBC: x / WBC x   Sq Epi: x / Non Sq Epi: x / Bacteria: x    A/P: 89yFemale with left distal femur fracture and possible bony metastasis    NWB LLE IN BJKI at all times, strict bed rest  Plan for operative fixation of distal femur fracture possibly tomorrow 5/4/25 pending further evaluation of possible malignancy  FU CT CAP, FU MR L Femur w/wo IV contrast for concern of metastatic lesions  NPO after midnight, IVF while NPO  DVT PPX with Heparin, will hold DVT chemoprophylaxis after midnight the day prior to surgery  Appreciate documentation of medical and cardiac optimization pending TTE  Pain Control As Needed  Ice hip as tolerated  Finalization of plan pending further discussion with Dr Groves

## 2025-05-03 NOTE — CHART NOTE - NSCHARTNOTEFT_GEN_A_CORE
Discussed findings below from CT chest/abdomen/pelvis with Dr Groves. Findings concerning for possible malignancy and recommending further workup.  -Recommending heme/onc consult for possible malignancy as will determine orthopedic management plan  -FU SPEP/UPEP/LDH   -Discussed with Dr Groves who agrees    < from: CT Abdomen and Pelvis No Cont (05.03.25 @ 08:48) >    IMPRESSION:  7.5 cm indeterminate mass in the lateral aspect of the right axilla,   poorly characterized on this unenhanced CT and suspect for neoplasm.   Recommend contrast-enhanced MRI to better evaluate.    < end of copied text > Discussed findings below from CT chest/abdomen/pelvis with Dr Groves. Findings concerning for possible malignancy and recommending further workup.    Imaging:  < from: CT Abdomen and Pelvis No Cont (05.03.25 @ 08:48) >    IMPRESSION:  7.5 cm indeterminate mass in the lateral aspect of the right axilla,   poorly characterized on this unenhanced CT and suspect for neoplasm.   Recommend contrast-enhanced MRI to better evaluate.    < end of copied text >    -Recommending heme/onc consult for possible malignancy as will determine orthopedic management plan  -Recommend palliative care consult for GOC discussion with patient and family  -FU SPEP/UPEP/LDH   -Discussed with Dr Groves who agrees Discussed findings below from MRI L Femur w/wo IV contrast and non-contrast CT chest/abdomen/pelvis with Dr Groves. Findings concerning for possible malignancy and recommending further workup.    Imaging:  < from: CT Abdomen and Pelvis No Cont (05.03.25 @ 08:48) >    IMPRESSION:  7.5 cm indeterminate mass in the lateral aspect of the right axilla,   poorly characterized on this unenhanced CT and suspect for neoplasm.   Recommend contrast-enhanced MRI to better evaluate.    < from: MR Femur w/wo IV Cont, Left (05.03.25 @ 12:30) >    Impression:  Acute, comminuted, intra-articular distal femoral fracture which appears   pathologic in etiology with small additional foci of metastatic disease.    < end of copied text >    -Recommending heme/onc consult for possible malignancy as will determine orthopedic management plan  -Recommend palliative care consult for GOC discussion with patient and family  -FU SPEP/UPEP/LDH   -Discussed with Dr Groves who agrees Discussed findings below from MRI L Femur w/wo IV contrast and non-contrast CT chest/abdomen/pelvis with Dr Groves. Findings concerning for possible malignancy and recommending further workup.    Imaging:  < from: CT Abdomen and Pelvis No Cont (05.03.25 @ 08:48) >    IMPRESSION:  7.5 cm indeterminate mass in the lateral aspect of the right axilla,   poorly characterized on this unenhanced CT and suspect for neoplasm.   Recommend contrast-enhanced MRI to better evaluate.    < from: MR Femur w/wo IV Cont, Left (05.03.25 @ 12:30) >    Impression:  Acute, comminuted, intra-articular distal femoral fracture which appears   pathologic in etiology with small additional foci of metastatic disease.    < end of copied text >    -Recommending heme/onc consult for possible malignancy as will determine orthopedic management plan  -Recommend palliative care consult for GOC discussion with patient and family  -Final plan regarding distal femur fracture pending further workup by heme/onc team  -FU SPEP/UPEP/LDH   -Discussed with Dr Groves who agrees

## 2025-05-03 NOTE — DISCHARGE NOTE PROVIDER - NSDCCPCAREPLAN_GEN_ALL_CORE_FT
PRINCIPAL DISCHARGE DIAGNOSIS  Diagnosis: Other fracture of distal end of left femur  Assessment and Plan of Treatment: Concern for pathologic fracture.  You will be transferred to [facility] for surgical intervention with Dr. Shay Dolan.  Tissue sample to be obtained during surgical procedure.  Follow up with Heme/Onc Dr. Cowan in 1-2 weeks.      SECONDARY DISCHARGE DIAGNOSES  Diagnosis: Atrial flutter  Assessment and Plan of Treatment: Eliquis was held for potential surgery.  Continue heparin drip for now.

## 2025-05-03 NOTE — PROGRESS NOTE ADULT - SUBJECTIVE AND OBJECTIVE BOX
CHIEF COMPLAINT/INTERVAL HISTORY:  Pt. seen and evaluated for left femur fracture.  Pt. is in no distress.  Lying in bed and reports improvement in left LE pain.  Denies having CP or SOB.      REVIEW OF SYSTEMS:  No fever, CP, SOB, or abdominal pain.      Vital Signs Last 24 Hrs  T(C): 36.4 (03 May 2025 06:24), Max: 36.8 (02 May 2025 23:07)  T(F): 97.5 (03 May 2025 06:24), Max: 98.3 (02 May 2025 23:07)  HR: 87 (03 May 2025 06:24) (60 - 87)  BP: 147/76 (03 May 2025 06:24) (147/76 - 161/84)  BP(mean): 106 (02 May 2025 21:24) (106 - 106)  RR: 18 (03 May 2025 06:24) (17 - 18)  SpO2: 96% (03 May 2025 06:24) (96% - 99%)    Parameters below as of 03 May 2025 06:24  Patient On (Oxygen Delivery Method): room air        PHYSICAL EXAM:  GENERAL: NAD  HEENT: blind, hearing normal  Chest: CTA bilaterally, no wheezing  CV: S1S2, RRR,   GI: soft, +BS, NT/ND  Musculoskeletal: LLE in ACE wrap and brace  Psychiatric: affect nL, mood nL  Skin: warm and dry    LABS:                        13.1   13.84 )-----------( 182      ( 02 May 2025 16:11 )             39.4     05-02    141  |  109[H]  |  18  ----------------------------<  127[H]  3.6   |  23  |  0.87    Ca    9.1      02 May 2025 16:11    TPro  6.4  /  Alb  3.1[L]  /  TBili  1.3[H]  /  DBili  x   /  AST  13[L]  /  ALT  20  /  AlkPhos  66  05-02    PT/INR - ( 02 May 2025 16:11 )   PT: 15.1 sec;   INR: 1.28 ratio         PTT - ( 02 May 2025 16:11 )  PTT:33.4 sec  Urinalysis Basic - ( 02 May 2025 16:11 )    Color: x / Appearance: x / SG: x / pH: x  Gluc: 127 mg/dL / Ketone: x  / Bili: x / Urobili: x   Blood: x / Protein: x / Nitrite: x   Leuk Esterase: x / RBC: x / WBC x   Sq Epi: x / Non Sq Epi: x / Bacteria: x

## 2025-05-03 NOTE — DISCHARGE NOTE PROVIDER - NSDCMRMEDTOKEN_GEN_ALL_CORE_FT
brimonidine 0.2% ophthalmic solution: 1 drop(s) in the right eye 2 times a day  Eliquis 2.5 mg oral tablet: 1 tab(s) orally 2 times a day  nebivolol 2.5 mg oral tablet: 1 tab(s) orally once a day  ramipril 2.5 mg oral tablet: 1 tab(s) orally once a day (at bedtime)  timolol maleate 0.5% ophthalmic solution: 1 drop(s) in the right eye 2 times a day   brimonidine 0.2% ophthalmic solution: 1 drop(s) in the right eye 2 times a day  heparin 100 units/mL-D5% intravenous solution: 800 unit(s) intravenous every hour  HYDROmorphone: 0.5 milligram(s) intravenous every 4 hours as needed for  severe pain  HYDROmorphone 0.2 mg/mL-NaCl 0.9% intravenous solution: 1 milliliter(s) intravenous every 4 hours As needed Moderate Pain (4 - 6)  nebivolol 2.5 mg oral tablet: 1 tab(s) orally once a day  polyethylene glycol 3350 oral powder for reconstitution: 17 gram(s) orally once a day As needed Constipation  ramipril 2.5 mg oral tablet: 1 tab(s) orally once a day (at bedtime)  senna leaf extract oral tablet: 2 tab(s) orally once a day (at bedtime)  timolol maleate 0.5% ophthalmic solution: 1 drop(s) in the right eye 2 times a day

## 2025-05-03 NOTE — DISCHARGE NOTE PROVIDER - HOSPITAL COURSE
Pt is a 88y/o with PMHx blindness, glaucoma, HTN, atrial flutter and hx PE on eliquis who presents to the ED with L leg pain s/p mechanical fall at home. Pt slipped and fell in the bathroom approx 10am this morning and landed on her left side. She did not feel dizzy prior, did not lose consciousness and did not hit her head. She was unable to ambulate and noted significant pain in her leg, prompting EMS to be called. Pt currently is still c/o pain although improved s/p IV pain medication. Pt also with nausea that is improved s/p Zofran. She denies any other symptoms including recent illness, fever, chills, chest pain, shortness of breath, MONCADA, abdominal pain, diarrhea, constipation.    ED Course:   Vitals: BP: 148/60, HR: 60, Temp: 97.8, RR: 17, SpO2: 98% on RA  Labs: 13.84 WBC, PT/INR 15.1/1.28  CXR: Hyperinflated lungs. No active infiltrates  CT HEAD: Possible right globe retinal detachment. Recommend ophthalmologic consultation. No acute intracranial hemorrhage, mass effect, or midline shift. Chronic findings as above.  CT CERVICAL SPINE: No acute fracture or traumatic subluxation. Multi-level degenerative changes.  XR L hip w/pelvis: Status post ORIF left hip. Cortical irregularity left superior inferior pubic rami medially may reflect old trauma. Acute fracture not excluded. Recommend CT as clinically indicated. Sacroiliac joints intact. Degenerative change bilateral hips greater on the right. Diffuse demineralization Nonspecific pelvic calcifications. Curvilinear vascular calcification in the lower abdomen may reflect ectasia, consider CT abdomen as warranted  XR L knee/femur: Acute comminuted displaced supracondylar fracture  of the left femur. Demineralization. Mild degenerative change. Soft tissue swelling.  CT L femur non con: Acute impacted displaced intra-articular fracture of the distal femur with large lipohemarthrosis at the knee. Findings of an underlying possible lesion are present at the site of fracture, with additional findings of abnormal rounded foci of soft tissue within the marrow, suggesting the possibility of underlying metastatic disease. Strongly   advise further evaluation with femur MRI.  EKG: sinus@78bpm  Received in the ED: dilaudid 0.5mg IV x2, Zofran, ofirmev x1    Pt. was admitted with orthopedic and cardiology consultation.  She was given analgesia as needed.  CT C/A/P___________ and MRI Left femur___________.     Pt is a 88y/o with PMHx blindness, glaucoma, HTN, atrial flutter and hx PE on eliquis who presents to the ED with L leg pain s/p mechanical fall at home. Pt slipped and fell in the bathroom approx 10am this morning and landed on her left side. She did not feel dizzy prior, did not lose consciousness and did not hit her head. She was unable to ambulate and noted significant pain in her leg, prompting EMS to be called. Pt currently is still c/o pain although improved s/p IV pain medication. Pt also with nausea that is improved s/p Zofran. She denies any other symptoms including recent illness, fever, chills, chest pain, shortness of breath, [place], abdominal pain, diarrhea, constipation.    ED Course:   Vitals: BP: 148/60, HR: 60, Temp: 97.8, RR: 17, SpO2: 98% on RA  Labs: 13.84 WBC, PT/INR 15.1/1.28  CXR: Hyperinflated lungs. No active infiltrates  CT HEAD: Possible right globe retinal detachment. Recommend ophthalmologic consultation. No acute intracranial hemorrhage, mass effect, or midline shift. Chronic findings as above.  CT CERVICAL SPINE: No acute fracture or traumatic subluxation. Multi-level degenerative changes.  XR L hip w/pelvis: Status post ORIF left hip. Cortical irregularity left superior inferior pubic rami medially may reflect old trauma. Acute fracture not excluded. Recommend CT as clinically indicated. Sacroiliac joints intact. Degenerative change bilateral hips greater on the right. Diffuse demineralization Nonspecific pelvic calcifications. Curvilinear vascular calcification in the lower abdomen may reflect ectasia, consider CT abdomen as warranted  XR L knee/femur: Acute comminuted displaced supracondylar fracture  of the left femur. Demineralization. Mild degenerative change. Soft tissue swelling.  CT L femur non con: Acute impacted displaced intra-articular fracture of the distal femur with large lipohemarthrosis at the knee. Findings of an underlying possible lesion are present at the site of fracture, with additional findings of abnormal rounded foci of soft tissue within the marrow, suggesting the possibility of underlying metastatic disease. Strongly   advise further evaluation with femur MRI.  EKG: sinus@78bpm  Received in the ED: dilaudid 0.5mg IV x2, Zofran, ofirmev x1    Pt. was admitted with orthopedic and cardiology consultation.  Her Eliquis was held for potential surgery and patient started on heparin drip.  She was given analgesia as needed.  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  CT C/A/P:  7.5 cm indeterminate mass in the lateral aspect of the right axilla, poorly characterized on this unenhanced CT and suspect for neoplasm. Recommend contrast-enhanced MRI to better evaluate.  Indeterminate 15 mm right renal lesion, possibly a hemorrhagic cyst. Recommend further evaluation with targeted ultrasound as neoplasm is also in the differential diagnosis.  Mild fibrotic change in the anterior aspect of the lower lungs.  MRI Left femur: Acute, comminuted, intra-articular distal femoral fracture which appears pathologic in etiology with small additional foci of metastatic disease.  Heme/Onc was consulted.  Per recommendations from orthopedic surgery patient to be transferred to [facility] to have surgical intervention with Dr. Shay Dolan.      On day of discharge patient is in no distress.  Denies having CP or SOB.  Afebrile and hemodynamically stable.

## 2025-05-03 NOTE — CONSULT NOTE ADULT - SUBJECTIVE AND OBJECTIVE BOX
Patient is a 89y old  Female who presents with a chief complaint of L femur fracture (02 May 2025 20:11). Pt states that she saw her Cardiologist this past Wednesday who started her on Nebivolol. Tolerating well without issue. Pt states she does not typically feel when she enters Flutter. Pt is active and does not experience shortness of breath with ambulation or exertion. Denies cardiac symptoms at this time.       HPI:  Pt is a 89y/o with PMHx blindness, glaucoma, HTN, atrial flutter and hx PE on eliquis who presents to the ED with L leg pain s/p mechanical fall at home. Pt slipped and fell in the bathroom approx 10am this morning and landed on her left side. She did not feel dizzy prior, did not lose consciousness and did not hit her head. She was unable to ambulate and noted significant pain in her leg, prompting EMS to be called. Pt currently is still c/o pain although improved s/p IV pain medication. Pt also with nausea that is improved s/p Zofran. She denies any other symtpoms including recent illness, fever, chills, chest pain, shortness of breath, [place], abdominal pain, diarrhea, constipation.    ED Course:   Vitals: BP: 148/60, HR: 60, Temp: 97.8, RR: 17, SpO2: 98% on RA  Labs: 13.84 WBC, PT/INR 15.1/1.28  CXR: Hyperinflated lungs. No active infiltrates  CT HEAD: Possible right globe retinal detachment. Recommend ophthalmologic consultation. No acute intracranial hemorrhage, mass effect, or midline shift. Chronic findings as above.  CT CERVICAL SPINE: No acute fracture or traumatic subluxation. Multi-level degenerative changes.  XR L hip w/pelvis: Status post ORIF left hip. Cortical irregularity left superior inferior pubic rami medially may reflect old trauma. Acute fracture not excluded. Recommend CT as clinically indicated. Sacroiliac joints intact. Degenerative change bilateral hips greater on the right. Diffuse demineralization Nonspecific pelvic calcifications. Curvilinear vascular calcification in the lower abdomen may reflect ectasia, consider CT abdomen as warranted  XR L knee/femur: Acute comminuted displaced supracondylar fracture  of the left femur. Demineralization. Mild degenerative change. Soft tissue swelling.  CT L femur non con: Acute impacted displaced intra-articular fracture of the distal femur with large lipohemarthrosis at the knee. Findings of an underlying possible lesion are present at the site of fracture, with additional findings of abnormal rounded foci of soft tissue within the marrow, suggesting the possibility of underlying metastatic disease. Strongly   advise further evaluation with femur MRI.  EKG: pending  Received in the ED: dilaudid 0.5mg IV x2, Zofran, ofirmev x1 (02 May 2025 20:11)      [facility]  - Cardiologist: Dr. Shay Rodriguez, [facility]   - ECHO: ordered   - EKG: Sinus rhythm with PAC 78 bpm     PAST MEDICAL & SURGICAL HISTORY:  High cholesterol      HTN (hypertension)      History of fracture of femur          MEDICATIONS  (STANDING):  acetaminophen   IVPB .. 1000 milliGRAM(s) IV Intermittent once  brimonidine 0.2% Ophthalmic Solution 1 Drop(s) Right EYE two times a day  heparin   Injectable 4000 Unit(s) IV Push once  heparin  Infusion.  Unit(s)/Hr (10 mL/Hr) IV Continuous <Continuous>  lisinopril 10 milliGRAM(s) Oral daily  nebivolol 2.5 milliGRAM(s) Oral daily  senna 2 Tablet(s) Oral at bedtime  sodium chloride 0.9%. 1000 milliLiter(s) (100 mL/Hr) IV Continuous <Continuous>  timolol 0.5% Solution 1 Drop(s) Right EYE two times a day    MEDICATIONS  (PRN):  aluminum hydroxide/magnesium hydroxide/simethicone Suspension 30 milliLiter(s) Oral every 4 hours PRN Dyspepsia  heparin   Injectable 4000 Unit(s) IV Push every 6 hours PRN For aPTT less than 40  heparin   Injectable 2000 Unit(s) IV Push every 6 hours PRN For aPTT between 40 - 57  HYDROmorphone  Injectable 0.2 milliGRAM(s) IV Push every 4 hours PRN Moderate Pain (4 - 6)  HYDROmorphone  Injectable 0.5 milliGRAM(s) IV Push every 4 hours PRN Severe Pain (7 - 10)  melatonin 3 milliGRAM(s) Oral at bedtime PRN Insomnia  ondansetron Injectable 4 milliGRAM(s) IV Push every 8 hours PRN Nausea and/or Vomiting  polyethylene glycol 3350 17 Gram(s) Oral daily PRN Constipation      FAMILY HISTORY:  FHx: colon cancer (Father)      Denies Family history of CAD or early MI    REVIEW OF SYSTEMS: As per HPI, otherwise negative       SOCIAL HISTORY:    No tobacco, Alcohol or Drug use    Vital Signs Last 24 Hrs  T(C): 36.4 (03 May 2025 06:24), Max: 36.8 (02 May 2025 23:07)  T(F): 97.5 (03 May 2025 06:24), Max: 98.3 (02 May 2025 23:07)  HR: 87 (03 May 2025 06:24) (60 - 87)  BP: 147/76 (03 May 2025 06:24) (147/76 - 161/84)  BP(mean): 106 (02 May 2025 21:24) (106 - 106)  RR: 18 (03 May 2025 06:24) (17 - 18)  SpO2: 96% (03 May 2025 06:24) (96% - 99%)    Parameters below as of 03 May 2025 06:24  Patient On (Oxygen Delivery Method): room air        Physical Exam:  T(C): 36.4 (05-03-25 @ 06:24), Max: 36.8 (05-02-25 @ 23:07)  HR: 87 (05-03-25 @ 06:24) (60 - 87)  BP: 147/76 (05-03-25 @ 06:24) (147/76 - 161/84)  RR: 18 (05-03-25 @ 06:24) (17 - 18)  SpO2: 96% (05-03-25 @ 06:24) (96% - 99%)    GENERAL: patient appears well, no acute distress, appropriately interactive  LUNGS: good air entry bilaterally, clear to auscultation, symmetric breath sounds, no wheezing or rhonchi appreciated  HEART: soft S1/S2, regular rate and rhythm, no murmurs noted, no lower extremity edema  GASTROINTESTINAL: abdomen is soft, nontender, nondistended, normoactive bowel sounds  INTEGUMENT: good skin turgor, warm skin, appears well perfused  MUSCULOSKELETAL: no clubbing or cyanosis; left lower extremity in brace, +1 pitting edema on left, trace on right   NEUROLOGIC: awake, alert, oriented x3, good muscle tone in all 4 extremities      I&O's Detail      LABS:                        13.1   13.84 )-----------( 182      ( 02 May 2025 16:11 )             39.4     05-02    141  |  109[H]  |  18  ----------------------------<  127[H]  3.6   |  23  |  0.87    Ca    9.1      02 May 2025 16:11    TPro  6.4  /  Alb  3.1[L]  /  TBili  1.3[H]  /  DBili  x   /  AST  13[L]  /  ALT  20  /  AlkPhos  66  05-02        PT/INR - ( 02 May 2025 16:11 )   PT: 15.1 sec;   INR: 1.28 ratio         PTT - ( 02 May 2025 16:11 )  PTT:33.4 sec  Urinalysis Basic - ( 02 May 2025 16:11 )    Color: x / Appearance: x / SG: x / pH: x  Gluc: 127 mg/dL / Ketone: x  / Bili: x / Urobili: x   Blood: x / Protein: x / Nitrite: x   Leuk Esterase: x / RBC: x / WBC x   Sq Epi: x / Non Sq Epi: x / Bacteria: x      I&O's Summary    BNP  RADIOLOGY & ADDITIONAL STUDIES:
Patient is a 89yFemale ambulator with assistive devices who presents to [place] ED w/ a c/o of left leg and knee pain. Pt present with family who states that the patient is blind so requires assistance with walker and a family member for guidance when ambulating. States that earlier today she fell in the bathroom and hit her leg on her bathtub. She was immediately unable to ambulate and noticed a deformity of the leg. Denies HT/LOC. Denies numbness, tingling or weakness. Of note patient has left hip IMN that was done in [place] 5-6 years ago.     T(C): 36.6 (05-02-25 @ 14:26), Max: 36.6 (05-02-25 @ 14:26)  HR: 60 (05-02-25 @ 14:26) (60 - 60)  BP: 148/60 (05-02-25 @ 14:26) (148/60 - 148/60)  RR: 17 (05-02-25 @ 14:26) (17 - 17)  SpO2: 98% (05-02-25 @ 14:26) (98% - 98%)        MEWS Score    High cholesterol    HTN (hypertension)    No significant past surgical history    WIT FALL, THIGH PN LEFT    90+    [email or web address]      PHYSICAL EXAMINATION  Gen: NAD, Resting comfortably    LLE:  Skin intact, obvious deformity to lower leg  TTP by distal femur and knee area, nowhere else along RLE  Motor: + EHL/FHL/TA/GSC  Sensory: +SILT: SPN/DPN/JUAN CARLOS/SAPH/TIB  Compartments soft and compressible  No calf tenderness  Dopplerable DP pulse, + PT    Secondary Assessment:  NC/AT, NTTP of clavicles, NTTP of C-,T-,L-Spine,  UEs: NTTP of Shoulders, Elbows, Wrists, Hands; NT with AROM/PROM of Shoulders, Elbows, Wrists, Hands; AIN/PIN/Med/Uln/Msc/Rad/Ax intact  RLE: Able to SLR, NT with Log Roll, NT with Heel Strike, NTTP of Hip, Knee, Ankle, foot; NT with AROM/PROM of Hip, Knee, Ankle, foot; Q/H/Gsc/TA/EHL/FHL intact    Imaging:  XR L Femur: distal femur fracture, pending official read  CT L Femur: pending    A/P: 89yFemale with left distal femur fracture    Placed in bulky france knee immobilizer in ED, keep on at all times  Plan for operative fixation of distal femur fracture possibly Sunday 5/4/25 pending optimization  FU CT L Femur for operative planning  Okay to feed patient today, will keep NPO after midnight tomorrow  DVT PPX with Heparin, will hold DVT chemoprophylaxis after midnight the day prior to surgery  Admission to medicine  Please document necessary medical optimizations for surgery   Pain Control As Needed  Ice hip as tolerated  NWB, Strict Bed Rest  SCDs while in bed  Finalization of plan pending further discussion with Dr Groves      
All records reviewed.  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 Fell in bathroom w resultant left hip pain and brought to ER found w extensive complicated fx left distal femur. Also 7,5cm partially calcified mass lat to right axilla    CT left femur w acute distal shaft fx w intra-articular extension to knee, large lipohemarthrosis, displaced fx frgments, telescoping of bone, butterfly fragment. Heterogen appearancer of bone marrow w multi rounded foci of abnormal signal felt suspicious for marrow replacing process at site of fx. Lobular abnormal internal marrow signal up to 5cm hematoma vs underlying lesion process. Prior fixation of left prox femur. Extensive soft tissue edema, enlargement and hemorrhage of vastus intermedius, also foci of fat within the vastus likely escaped from marrow up to 3.7cm.      CT c/a/p-5.5x7.5x6.5cm patially calcified soft tissue mass lateral asp of right axilla. Right renal cyst, 15mm indeterminate hypetrattenuated right upper pole renal lesin. 1.3cm left adnexal simple cyst    CT head, Cspine-right detached retina    Pt never had bone pains. Reports "1/2 thumb" size mass behind right axilla area of consern since teenager post dislocated shoulder,  x 1+month now noted larger mass (unalbe to give size or exact time, thinks cntinue to get sl bigger)  Never had mammogram        PAST MEDICAL & SURGICAL HISTORY:  High cholesterol      HTN (hypertension)      History of fracture of femur          Review of System:  see above, no anorexia weight loss fever pain    MEDICATIONS  (STANDING):  acetaminophen   IVPB .. 1000 milliGRAM(s) IV Intermittent once  brimonidine 0.2% Ophthalmic Solution 1 Drop(s) Right EYE two times a day  heparin  Infusion.  Unit(s)/Hr (10 mL/Hr) IV Continuous <Continuous>  lactated ringers. 1000 milliLiter(s) (100 mL/Hr) IV Continuous <Continuous>  lisinopril 10 milliGRAM(s) Oral daily  nebivolol 2.5 milliGRAM(s) Oral daily  senna 2 Tablet(s) Oral at bedtime  sodium chloride 0.9%. 1000 milliLiter(s) (100 mL/Hr) IV Continuous <Continuous>  timolol 0.5% Solution 1 Drop(s) Right EYE two times a day    MEDICATIONS  (PRN):  aluminum hydroxide/magnesium hydroxide/simethicone Suspension 30 milliLiter(s) Oral every 4 hours PRN Dyspepsia  heparin   Injectable 4000 Unit(s) IV Push every 6 hours PRN For aPTT less than 40  heparin   Injectable 2000 Unit(s) IV Push every 6 hours PRN For aPTT between 40 - 57  HYDROmorphone  Injectable 0.2 milliGRAM(s) IV Push every 4 hours PRN Moderate Pain (4 - 6)  HYDROmorphone  Injectable 0.5 milliGRAM(s) IV Push every 4 hours PRN Severe Pain (7 - 10)  melatonin 3 milliGRAM(s) Oral at bedtime PRN Insomnia  ondansetron Injectable 4 milliGRAM(s) IV Push every 8 hours PRN Nausea and/or Vomiting  polyethylene glycol 3350 17 Gram(s) Oral daily PRN Constipation      Allergies    No Known Allergies    Intolerances    codeine (Headache)  erythromycin (Stomach Upset)      SOCIAL HISTORY:  never tobacco or Etoh    FAMILY HISTORY:  FHx: colon cancer (Father)        Vital Signs Last 24 Hrs  T(C): 37 (03 May 2025 13:16), Max: 37 (03 May 2025 13:16)  T(F): 98.6 (03 May 2025 13:16), Max: 98.6 (03 May 2025 13:16)  HR: 92 (03 May 2025 13:16) (60 - 92)  BP: 110/72 (03 May 2025 13:16) (110/72 - 161/84)  BP(mean): 106 (02 May 2025 21:24) (106 - 106)  RR: 18 (03 May 2025 13:16) (17 - 18)  SpO2: 90% (03 May 2025 13:16) (90% - 99%)    Parameters below as of 03 May 2025 13:16  Patient On (Oxygen Delivery Method): room air        PHYSICAL EXAM:      General:frail elderly woman in bed in no acute distress  Eyes:sclera anicteric, pupils equal and EOMI  ENMT:buccal mucosa moist, nose midline  Neck:supple, trachea midline  Lungs:clear, no wheeze/rhonchi  Cardiovascular:regular rate and rhythm, S1 S2  Abdomen:soft, nontender, no organomegaly present, bowel sounds normal  Neurological:alert and oriented x3, No focal deficits  Breast: right-no palpabel masses  Chest Wall: lateral/posteroir to right axilla, oval-round mass ~7-8cm boggy, not mixed to skin, mobile, nontender to palp  Skin:no increased ecchymosis/petechiae/purpura  Lymph Nodes:no palpable cervical/supraclavicular lymph nodes enlargements  Extremities:no cyanosis/clubbing/edema        LABS:                        11.0   10.54 )-----------( 188      ( 03 May 2025 09:00 )             33.3     05-03 @ 09:00  WBC10.54  RBC3.56 Hgb11.0 Hct33.3  MCV93.5  Xmrs100  Auto Neutro-- Band-- Auto Lymph-- Atypical Lymph-- Reactive Lymph-- Auto Mono-- Auto Eos-- Auto Baso--        05-02 @ 16:11  WBC13.84  RBC4.26 Hgb13.1 Hct39.4  MCV92.5  [id number]  Auto Neutro-- Band-- Auto Lymph-- Atypical Lymph-- Reactive Lymph-- Auto Mono-- Auto Eos-- Auto Baso--          05-03    146[H]  |  114[H]  |  18  ----------------------------<  105[H]  4.9   |  24  |  0.87    Ca    9.2      03 May 2025 09:00    TPro  5.9[L]  /  Alb  2.9[L]  /  TBili  1.7[H]  /  DBili  x   /  AST  19  /  ALT  20  /  AlkPhos  61  05-03 05-02 @ 16:11  PT15.1 INR1.28  PTT33.4    Urinalysis Basic - ( 03 May 2025 09:00 )    Color: x / Appearance: x / SG: x / pH: x  Gluc: 105 mg/dL / Ketone: x  / Bili: x / Urobili: x   Blood: x / Protein: x / Nitrite: x   Leuk Esterase: x / RBC: x / WBC x   Sq Epi: x / Non Sq Epi: x / Bacteria: x        PERIPHERAL BLOOD SMEAR REVIEW:      RADIOLOGY & ADDITIONAL STUDIES:  < from: CT Chest No Cont (05.03.25 @ 08:48) >    ACC: [id number] EXAM:  CT ABDOMEN AND PELVIS   ORDERED BY: XANDER DESIR     ACC: [id number] EXAM:  CT CHEST   ORDERED BY: XANDER DESIR     PROCEDURE DATE:  05/03/2025          INTERPRETATION:  CLINICAL INFORMATION: Distal femur fracture in setting   of possible bony metastases.    COMPARISON: Left femur CT and radiographs from May 02, 2025 were reviewed.    CONTRAST/COMPLICATIONS:  IV Contrast: NONE  Oral Contrast: NONE  .    PROCEDURE:  CT of the Chest, Abdomen and Pelvis was performed.  Sagittal and coronal reformats were performed.    FINDINGS:  CHEST:  LUNGS AND LARGE AIRWAYS: Patent central airways. No pulmonary nodules.   Mild biapical scar. Peripheral reticular opacities in the anterior left   lower lobe, lingula and right middle lobe compatible with interstitial   lung disease. Platelike atelectasis at both lung bases.  PLEURA: No pleural effusion.  VESSELS: Atherosclerotic arterial calcifications, including coronary   artery calcification.  HEART: Heart size is normal. No pericardial effusion.  MEDIASTINUM AND SARMAD: No lymphadenopathy.  CHEST WALL AND LOWER NECK: 5.5 x 7.5 x 6.5 cm partially calcified soft   tissue mass in the lateral aspect of the right axilla.    ABDOMEN AND PELVIS:  LIVER: Within normal limits.  BILE DUCTS: Normal caliber.  GALLBLADDER: Within normal limits.  SPLEEN: Within normal limits.  PANCREAS: Within normal limits.  ADRENALS: Within normal limits.  KIDNEYS/URETERS: Right renal cyst. 15 mm hyperattenuating lesion at the   upper pole of the rightkidney, indeterminate.    BLADDER: Within normal limits.  REPRODUCTIVE ORGANS: 1.3 cm simple appearing left adnexal cyst. Scattered   uterine calcifications suggestive of fibroids.    BOWEL: No bowel obstruction. Appendix is normal. Colonic diverticulosis.   Small hiatal hernia.  PERITONEUM/RETROPERITONEUM: Within normal limits.  VESSELS: Atherosclerotic changes.  LYMPH NODES: No lymphadenopathy.  MUSCULOSKELETAL: Fatty atrophy of the left psoas and gluteal muscles,   suggestive of disuse atrophy. Diffuse expansion of the left thigh   musculature which could be due to edema or intramuscular blood.  No lytic or blastic bony lesion. Degenerative changes in the spine, both   shoulders and both hips. Status post left hip internal fixation. Moderate   L3 compression deformity. Old left rib fractures. Sacral Tarlov cyst.    IMPRESSION:  7.5 cm indeterminate mass in the lateral aspect of the right axilla,   poorly characterized on this unenhanced CT and suspect for neoplasm.   Recommend contrast-enhanced MRI to better evaluate.    Indeterminate 15 mm right renal lesion, possibly a hemorrhagic cyst.   Recommend further evaluation with targeted ultrasound as neoplasm is also   in the differential diagnosis.    Mild fibrotic change in the anterior aspect of the lower lungs.      < end of copied text >  < from: CT Femur No Cont, Left (05.02.25 @ 17:04) >  ACC: [id number] EXAM:  CT 3D RECONSTRUCT WO [place]ON   ORDERED BY: XANDER DESIR     ACC: [id number] EXAM:  CT FEMUR ONLY LT   ORDERED BY: XANDER DESIR     PROCEDURE DATE:  05/02/2025          INTERPRETATION:  CLINICAL INDICATION:Distal femur fracture    COMPARISON: Left hip and femur radiographs earlier on the same day.    TECHNIQUE: CT of the left femur was performed without intravenous   contrast.    INTERPRETATION:    : No additional findings.    Bones:  At the distal femur there is an acute comminuted distal femoral   shaft fracture with intra-articular extension to the knee joint with   large lipohemarthrosis. At the patellofemoral compartment there are   anteriorly displaced fracture fragments involving the distal femoral   trochlea. There is impaction at the site of fracture with telescoping of   bone by at least 2 cm. There is a butterfly fragment at the posterior   distal thigh as seen on series 306 images 37 and 38. There is a   heterogeneous appearance of the bone marrow with multiple rounded foci of   abnormal signal raising concern for an underlying marrow replacing   process; at the site of fracture there is abnormal internal marrow signal   focally present which is lobulated and irregular measuring up to 5 cm   craniocaudally which could represent hematoma related to this fracture   and/or an underlying lesion, difficult to separate.    There is moderate knee arthrosis.There has been prior fixation of the   left proximal femur with a intramedullary nail and a proximal femoral   head/neck interlocking screw with cement fixation. There is no   periprosthetic fracture involving the proximal femur. The hardware is   intact. A healed deformity is present at the level of the   intertrochanteric region.    Soft tissues: There is extensive soft tissue edema, enlargement and   hemorrhage involving the vastus intermedius. There are also foci of fat   within the vastus intermedius likely escaped from the marrow of bone the   largest component measuring up to 3.7 cm craniocaudally.    IMPRESSION:  Acute impacted displaced intra-articular fracture of the distal femur   with large lipohemarthrosis at the knee. Findings of an underlying   possible lesion are present at the site of fracture, with additional   findings of abnormal rounded foci of soft tissue within the marrow,   suggesting the possibility of underlying metastatic disease. Strongly   advise further evaluation with femur MRI.    RECOMMENDATION: Femur MRI, if not clinically contraindicated.    --- End of Report ---    < end of copied text >  < from: CT Cervical Spine No Cont (05.02.25 @ 15:32) >  ACC: [id number] EXAM:  CT CERVICAL SPINE   ORDERED BY: SHAUNNA MARKS     ACC: [id number] EXAM:  CT BRAIN   ORDERED BY: SHAUNNA MARKS     PROCEDURE DATE:  05/02/2025          INTERPRETATION:  CLINICAL INFORMATION: fall, on eliquis    COMPARISON: None.    CONTRAST:  IV Contrast: NONE  .    TECHNIQUE:    CT BRAIN: Serial axial images were obtained from the skull base to the   vertex using multi-slice helical technique. Sagittal and coronal   reformats were obtained.    CT CERVICAL SPINE: Axial images were obtained of the cervical spine using   multislice helical technique. Reformatted coronal and sagittal images   were obtained.    FINDINGS:    CT BRAIN:    VENTRICLES AND SULCI: Age appropriate involutional changes.  INTRA-AXIAL: No mass effect, acute hemorrhage, or midline shift.  Severe   extent confluent periventricular and subcortical white matter   hypodensities, consistent with microvascular type changes.  EXTRA-AXIAL: No mass or fluid collection. Basal cisterns are normal in   appearance.    VISUALIZED SINUSES:  Clear.  TYMPANOMASTOID CAVITIES:  Clear.  VISUALIZED ORBITS: Intermediate attenuation along the posterior margin of   the right globe may represent retinal detachment.  CALVARIUM: Intact.      CT CERVICAL SPINE:    VERTEBRAE:  Normal in height. No acute fracture. Multilevel degenerative   changes including marginal osteophytes.  ALIGNMENT: No subluxation or scoliosis.  INTERVERTEBRAL DISC SPACES: Severe disc space narrowing at C5-C6 and   C6-C7. Multilevel disc osteophyte complexes and ligamentum flavum   thickening indent the ventral thecal sac and contribute to variable   degree central canal stenosis. No definite high-grade central canal   stenosis. Multilevel facet hypertrophy and uncovertebral spurring   contribute to variable degree neural foraminal stenosis.    VISUALIZED LUNGS: Apical scarring, right greater than left.    MISCELLANEOUS:  None.      IMPRESSION:    CT HEAD:  Possible right globe retinal detachment. Recommend ophthalmologic   consultation.  No acute intracranial hemorrhage, mass effect, or midline shift. Chronic   findings as above.    CT CERVICAL SPINE:  No acute fracture or traumatic subluxation.    Multi-level degenerative changes.      < end of copied text >  < from: MR Femur w/wo IV Cont, Left (05.03.25 @ 12:30) >    ACC: [id number] EXAM:  MR FEMUR WAW IC LT   ORDERED BY: XANDER DESIR     PROCEDURE DATE:  05/03/2025          INTERPRETATION:  Clinical Information: Distal femoral fracture    Comparison: CT scan of the left femur from 5/2/2025 and left knee   radiographs from 5/2/2025.    Technique:  MRI of the left femur.  Intravenous Contrast: 5.5 cc Gadavist contrast were administered and 2.0   cc were discarded.    Findings:    Again noted is a acute comminuted, intra-articular fracture of the left   distal femur beginning at the distal femoral metadiaphysis medially.   There is 0.7 cm of medial displacement of the major distal fracture   fragment. There is signal abnormality at this location which appears   somewhat ovoid (4, 13) and extends into the adjacent soft tissues   greatest posteriorly (10, 26) and posterior medially at the cranial   aspect which is favored to be related to an underlying lesion. The   adductor armida inserts into the region of the soft tissue mass. There   are small rounded foci of signal abnormality just above the fracture line   laterally and posterior laterally which are suspicious for additional   foci of metastatic disease. There is knee lipohemarthrosis again noted.    There is extensive intramuscular edema greatest within the vastus   intermedius and medialis muscles . Edema extends into the proximally   imaged posterior compartment of the leg. There is subcutaneous edema   greatest laterally. There is a small left hip joint effusion partially   imaged. There is a partially imaged left greater trochanteric bursitis.    Impression:  Acute, comminuted, intra-articular distal femoral fracture which appears   pathologic in etiology with small additional foci of metastatic disease.    --- End of Report ---      < end of copied text >

## 2025-05-03 NOTE — CONSULT NOTE ADULT - ATTENDING COMMENTS
89 yo F with HTN, Blindness, Glaucoma, Atrial flutter and remote hx of PE on Eliquis who presents to the ED with L leg pain s/p mechanical fall at home, found to have Femur Fracture, consulted for cardiac clearance.     - has been doing well from cv standpoint  - history of atrial flutter, though in sr without worrisome findings  - eliquis on hold for now, and on heparin gtt in short term  - cont bb  - echocardiogram performed with result pending  - no suggestion of volume overload  - bp elevated in setting of pain. cont with ace inhibitor  - for mri of hip per ortho  - no cardiac contraindication to proceeding to OR. Routine cardiac monitoring is recommended.   - will follow with you

## 2025-05-04 ENCOUNTER — INPATIENT (INPATIENT)
Facility: HOSPITAL | Age: 89
LOS: 3 days | Discharge: NURSING, REHAB OR LONG-TERM CARE | DRG: 534 | End: 2025-05-08
Attending: STUDENT IN AN ORGANIZED HEALTH CARE EDUCATION/TRAINING PROGRAM | Admitting: FAMILY MEDICINE
Payer: MEDICARE

## 2025-05-04 VITALS
TEMPERATURE: 99 F | OXYGEN SATURATION: 91 % | SYSTOLIC BLOOD PRESSURE: 121 MMHG | RESPIRATION RATE: 18 BRPM | HEART RATE: 80 BPM | DIASTOLIC BLOOD PRESSURE: 62 MMHG

## 2025-05-04 VITALS
DIASTOLIC BLOOD PRESSURE: 73 MMHG | HEART RATE: 63 BPM | TEMPERATURE: 99 F | SYSTOLIC BLOOD PRESSURE: 128 MMHG | OXYGEN SATURATION: 91 % | RESPIRATION RATE: 17 BRPM

## 2025-05-04 DIAGNOSIS — Z87.81 PERSONAL HISTORY OF (HEALED) TRAUMATIC FRACTURE: Chronic | ICD-10-CM

## 2025-05-04 DIAGNOSIS — S72.302A UNSPECIFIED FRACTURE OF SHAFT OF LEFT FEMUR, INITIAL ENCOUNTER FOR CLOSED FRACTURE: ICD-10-CM

## 2025-05-04 LAB
ALBUMIN SERPL ELPH-MCNC: 2.5 G/DL — LOW (ref 3.3–5)
ALBUMIN SERPL ELPH-MCNC: 2.5 G/DL — LOW (ref 3.3–5)
ALP SERPL-CCNC: 56 U/L — SIGNIFICANT CHANGE UP (ref 40–120)
ALP SERPL-CCNC: 57 U/L — SIGNIFICANT CHANGE UP (ref 40–120)
ALT FLD-CCNC: 43 U/L — SIGNIFICANT CHANGE UP (ref 12–78)
ALT FLD-CCNC: 44 U/L — SIGNIFICANT CHANGE UP (ref 12–78)
ANION GAP SERPL CALC-SCNC: 2 MMOL/L — LOW (ref 5–17)
ANION GAP SERPL CALC-SCNC: 3 MMOL/L — LOW (ref 5–17)
APTT BLD: 30.8 SEC — SIGNIFICANT CHANGE UP (ref 26.1–36.8)
APTT BLD: 51.2 SEC — HIGH (ref 26.1–36.8)
APTT BLD: 92 SEC — HIGH (ref 26.1–36.8)
AST SERPL-CCNC: 50 U/L — HIGH (ref 15–37)
AST SERPL-CCNC: 51 U/L — HIGH (ref 15–37)
BASOPHILS # BLD AUTO: 0.05 K/UL — SIGNIFICANT CHANGE UP (ref 0–0.2)
BASOPHILS NFR BLD AUTO: 0.5 % — SIGNIFICANT CHANGE UP (ref 0–2)
BILIRUB SERPL-MCNC: 0.8 MG/DL — SIGNIFICANT CHANGE UP (ref 0.2–1.2)
BILIRUB SERPL-MCNC: 0.8 MG/DL — SIGNIFICANT CHANGE UP (ref 0.2–1.2)
BUN SERPL-MCNC: 21 MG/DL — SIGNIFICANT CHANGE UP (ref 7–23)
BUN SERPL-MCNC: 21 MG/DL — SIGNIFICANT CHANGE UP (ref 7–23)
CALCIUM SERPL-MCNC: 8.5 MG/DL — SIGNIFICANT CHANGE UP (ref 8.5–10.1)
CALCIUM SERPL-MCNC: 8.5 MG/DL — SIGNIFICANT CHANGE UP (ref 8.5–10.1)
CHLORIDE SERPL-SCNC: 115 MMOL/L — HIGH (ref 96–108)
CHLORIDE SERPL-SCNC: 115 MMOL/L — HIGH (ref 96–108)
CO2 SERPL-SCNC: 26 MMOL/L — SIGNIFICANT CHANGE UP (ref 22–31)
CO2 SERPL-SCNC: 26 MMOL/L — SIGNIFICANT CHANGE UP (ref 22–31)
CREAT SERPL-MCNC: 0.92 MG/DL — SIGNIFICANT CHANGE UP (ref 0.5–1.3)
CREAT SERPL-MCNC: 0.93 MG/DL — SIGNIFICANT CHANGE UP (ref 0.5–1.3)
EGFR: 59 ML/MIN/1.73M2 — LOW
EGFR: 59 ML/MIN/1.73M2 — LOW
EGFR: 60 ML/MIN/1.73M2 — SIGNIFICANT CHANGE UP
EGFR: 60 ML/MIN/1.73M2 — SIGNIFICANT CHANGE UP
EOSINOPHIL # BLD AUTO: 0.27 K/UL — SIGNIFICANT CHANGE UP (ref 0–0.5)
EOSINOPHIL NFR BLD AUTO: 2.9 % — SIGNIFICANT CHANGE UP (ref 0–6)
GLUCOSE SERPL-MCNC: 132 MG/DL — HIGH (ref 70–99)
GLUCOSE SERPL-MCNC: 133 MG/DL — HIGH (ref 70–99)
HCT VFR BLD CALC: 27.9 % — LOW (ref 34.5–45)
HGB BLD-MCNC: 8.2 G/DL — LOW (ref 11.5–15.5)
HGB BLD-MCNC: 9.3 G/DL — LOW (ref 11.5–15.5)
HGB BLD-MCNC: 9.3 G/DL — LOW (ref 11.5–15.5)
IMM GRANULOCYTES NFR BLD AUTO: 0.5 % — SIGNIFICANT CHANGE UP (ref 0–0.9)
INR BLD: 1.1 RATIO — SIGNIFICANT CHANGE UP (ref 0.85–1.16)
LYMPHOCYTES # BLD AUTO: 1.26 K/UL — SIGNIFICANT CHANGE UP (ref 1–3.3)
LYMPHOCYTES # BLD AUTO: 13.3 % — SIGNIFICANT CHANGE UP (ref 13–44)
MAGNESIUM SERPL-MCNC: 1.8 MG/DL — SIGNIFICANT CHANGE UP (ref 1.6–2.6)
MCHC RBC-ENTMCNC: 29.4 G/DL — LOW (ref 32–36)
MCHC RBC-ENTMCNC: 30.8 PG — SIGNIFICANT CHANGE UP (ref 27–34)
MCHC RBC-ENTMCNC: 31.2 PG — SIGNIFICANT CHANGE UP (ref 27–34)
MCHC RBC-ENTMCNC: 31.2 PG — SIGNIFICANT CHANGE UP (ref 27–34)
MCHC RBC-ENTMCNC: 33.3 G/DL — SIGNIFICANT CHANGE UP (ref 32–36)
MCHC RBC-ENTMCNC: 33.3 G/DL — SIGNIFICANT CHANGE UP (ref 32–36)
MCV RBC AUTO: 104.9 FL — HIGH (ref 80–100)
MCV RBC AUTO: 93.6 FL — SIGNIFICANT CHANGE UP (ref 80–100)
MCV RBC AUTO: 93.6 FL — SIGNIFICANT CHANGE UP (ref 80–100)
MONOCYTES # BLD AUTO: 0.95 K/UL — HIGH (ref 0–0.9)
MONOCYTES NFR BLD AUTO: 10.1 % — SIGNIFICANT CHANGE UP (ref 2–14)
NEUTROPHILS # BLD AUTO: 6.87 K/UL — SIGNIFICANT CHANGE UP (ref 1.8–7.4)
NEUTROPHILS NFR BLD AUTO: 72.7 % — SIGNIFICANT CHANGE UP (ref 43–77)
NRBC # BLD AUTO: 0 K/UL — SIGNIFICANT CHANGE UP (ref 0–0)
NRBC # FLD: 0 K/UL — SIGNIFICANT CHANGE UP (ref 0–0)
NRBC BLD AUTO-RTO: 0 /100 WBCS — SIGNIFICANT CHANGE UP (ref 0–0)
PLATELET # BLD AUTO: 147 K/UL — LOW (ref 150–400)
PLATELET # BLD AUTO: 153 K/UL — SIGNIFICANT CHANGE UP (ref 150–400)
PLATELET # BLD AUTO: 155 K/UL — SIGNIFICANT CHANGE UP (ref 150–400)
PMV BLD: 9.9 FL — SIGNIFICANT CHANGE UP (ref 7–13)
POTASSIUM SERPL-MCNC: 4.7 MMOL/L — SIGNIFICANT CHANGE UP (ref 3.5–5.3)
POTASSIUM SERPL-MCNC: 4.7 MMOL/L — SIGNIFICANT CHANGE UP (ref 3.5–5.3)
POTASSIUM SERPL-SCNC: 4.7 MMOL/L — SIGNIFICANT CHANGE UP (ref 3.5–5.3)
POTASSIUM SERPL-SCNC: 4.7 MMOL/L — SIGNIFICANT CHANGE UP (ref 3.5–5.3)
PROT SERPL-MCNC: 5.4 G/DL — LOW (ref 6–8.3)
PROT SERPL-MCNC: 5.4 G/DL — LOW (ref 6–8.3)
PROTHROM AB SERPL-ACNC: 12.9 SEC — SIGNIFICANT CHANGE UP (ref 9.9–13.4)
RBC # BLD: 2.66 M/UL — LOW (ref 3.8–5.2)
RBC # BLD: 2.98 M/UL — LOW (ref 3.8–5.2)
RBC # BLD: 2.98 M/UL — LOW (ref 3.8–5.2)
RBC # FLD: 13.9 % — SIGNIFICANT CHANGE UP (ref 10.3–14.5)
RBC # FLD: 14 % — SIGNIFICANT CHANGE UP (ref 10.3–14.5)
RBC # FLD: 14 % — SIGNIFICANT CHANGE UP (ref 10.3–14.5)
SODIUM SERPL-SCNC: 143 MMOL/L — SIGNIFICANT CHANGE UP (ref 135–145)
SODIUM SERPL-SCNC: 144 MMOL/L — SIGNIFICANT CHANGE UP (ref 135–145)
WBC # BLD: 11.11 K/UL — HIGH (ref 3.8–10.5)
WBC # BLD: 9.45 K/UL — SIGNIFICANT CHANGE UP (ref 3.8–10.5)
WBC # BLD: 9.51 K/UL — SIGNIFICANT CHANGE UP (ref 3.8–10.5)
WBC # FLD AUTO: 11.11 K/UL — HIGH (ref 3.8–10.5)
WBC # FLD AUTO: 9.45 K/UL — SIGNIFICANT CHANGE UP (ref 3.8–10.5)
WBC # FLD AUTO: 9.51 K/UL — SIGNIFICANT CHANGE UP (ref 3.8–10.5)

## 2025-05-04 PROCEDURE — 93970 EXTREMITY STUDY: CPT | Mod: 26

## 2025-05-04 PROCEDURE — 99232 SBSQ HOSP IP/OBS MODERATE 35: CPT

## 2025-05-04 PROCEDURE — 99223 1ST HOSP IP/OBS HIGH 75: CPT | Mod: 57

## 2025-05-04 PROCEDURE — 99239 HOSP IP/OBS DSCHRG MGMT >30: CPT

## 2025-05-04 RX ORDER — MAGNESIUM, ALUMINUM HYDROXIDE 200-200 MG
30 TABLET,CHEWABLE ORAL EVERY 4 HOURS
Refills: 0 | Status: DISCONTINUED | OUTPATIENT
Start: 2025-05-04 | End: 2025-05-08

## 2025-05-04 RX ORDER — MELATONIN 5 MG
3 TABLET ORAL AT BEDTIME
Refills: 0 | Status: DISCONTINUED | OUTPATIENT
Start: 2025-05-04 | End: 2025-05-08

## 2025-05-04 RX ORDER — HEPARIN SODIUM 1000 [USP'U]/ML
2000 INJECTION INTRAVENOUS; SUBCUTANEOUS EVERY 6 HOURS
Refills: 0 | Status: DISCONTINUED | OUTPATIENT
Start: 2025-05-04 | End: 2025-05-05

## 2025-05-04 RX ORDER — HYDROMORPHONE/SOD CHLOR,ISO/PF 2 MG/10 ML
1 SYRINGE (ML) INJECTION
Qty: 0 | Refills: 0 | DISCHARGE
Start: 2025-05-04

## 2025-05-04 RX ORDER — BRIMONIDINE TARTRATE 1.5 MG/ML
1 SOLUTION/ DROPS OPHTHALMIC
Refills: 0 | DISCHARGE

## 2025-05-04 RX ORDER — CEFAZOLIN SODIUM IN 0.9 % NACL 3 G/100 ML
2000 INTRAVENOUS SOLUTION, PIGGYBACK (ML) INTRAVENOUS ONCE
Refills: 0 | Status: DISCONTINUED | OUTPATIENT
Start: 2025-05-04 | End: 2025-05-08

## 2025-05-04 RX ORDER — LISINOPRIL 30 MG/1
25 TABLET ORAL DAILY
Refills: 0 | Status: DISCONTINUED | OUTPATIENT
Start: 2025-05-04 | End: 2025-05-08

## 2025-05-04 RX ORDER — HEPARIN SODIUM 1000 [USP'U]/ML
4000 INJECTION INTRAVENOUS; SUBCUTANEOUS EVERY 6 HOURS
Refills: 0 | Status: DISCONTINUED | OUTPATIENT
Start: 2025-05-04 | End: 2025-05-05

## 2025-05-04 RX ORDER — CEFAZOLIN SODIUM IN 0.9 % NACL 3 G/100 ML
2000 INTRAVENOUS SOLUTION, PIGGYBACK (ML) INTRAVENOUS ONCE
Refills: 0 | Status: DISCONTINUED | OUTPATIENT
Start: 2025-05-04 | End: 2025-05-04

## 2025-05-04 RX ORDER — POLYETHYLENE GLYCOL 3350 17 G/17G
17 POWDER, FOR SOLUTION ORAL
Qty: 0 | Refills: 0 | DISCHARGE
Start: 2025-05-04

## 2025-05-04 RX ORDER — HYDROMORPHONE/SOD CHLOR,ISO/PF 2 MG/10 ML
0.5 SYRINGE (ML) INJECTION
Qty: 0 | Refills: 0 | DISCHARGE
Start: 2025-05-04

## 2025-05-04 RX ORDER — TIMOLOL MALEATE 6.8 MG/ML
1 SOLUTION OPHTHALMIC
Refills: 0 | DISCHARGE

## 2025-05-04 RX ORDER — HEPARIN SODIUM 1000 [USP'U]/ML
800 INJECTION INTRAVENOUS; SUBCUTANEOUS
Qty: 25000 | Refills: 0 | Status: DISCONTINUED | OUTPATIENT
Start: 2025-05-04 | End: 2025-05-04

## 2025-05-04 RX ORDER — TIMOLOL MALEATE 6.8 MG/ML
1 SOLUTION OPHTHALMIC
Refills: 0 | Status: DISCONTINUED | OUTPATIENT
Start: 2025-05-04 | End: 2025-05-08

## 2025-05-04 RX ORDER — SENNA 187 MG
2 TABLET ORAL
Qty: 0 | Refills: 0 | DISCHARGE
Start: 2025-05-04

## 2025-05-04 RX ORDER — LISINOPRIL 5 MG/1
10 TABLET ORAL DAILY
Refills: 0 | Status: DISCONTINUED | OUTPATIENT
Start: 2025-05-04 | End: 2025-05-08

## 2025-05-04 RX ORDER — APIXABAN 2.5 MG/1
1 TABLET, FILM COATED ORAL
Refills: 0 | DISCHARGE

## 2025-05-04 RX ORDER — ACETAMINOPHEN 500 MG/5ML
1000 LIQUID (ML) ORAL ONCE
Refills: 0 | Status: COMPLETED | OUTPATIENT
Start: 2025-05-04 | End: 2025-05-04

## 2025-05-04 RX ORDER — MUPIROCIN CALCIUM 20 MG/G
1 CREAM TOPICAL
Refills: 0 | Status: DISCONTINUED | OUTPATIENT
Start: 2025-05-04 | End: 2025-05-08

## 2025-05-04 RX ORDER — ONDANSETRON HCL/PF 4 MG/2 ML
4 VIAL (ML) INJECTION EVERY 8 HOURS
Refills: 0 | Status: DISCONTINUED | OUTPATIENT
Start: 2025-05-04 | End: 2025-05-08

## 2025-05-04 RX ORDER — SENNA 187 MG
2 TABLET ORAL AT BEDTIME
Refills: 0 | Status: DISCONTINUED | OUTPATIENT
Start: 2025-05-04 | End: 2025-05-08

## 2025-05-04 RX ORDER — RAMIPRIL 2.5 MG/1
1 CAPSULE ORAL
Refills: 0 | DISCHARGE

## 2025-05-04 RX ORDER — HEPARIN SODIUM 1000 [USP'U]/ML
800 INJECTION INTRAVENOUS; SUBCUTANEOUS
Qty: 25000 | Refills: 0 | Status: DISCONTINUED | OUTPATIENT
Start: 2025-05-04 | End: 2025-05-05

## 2025-05-04 RX ORDER — NEBIVOLOL 2.5 MG/1
1 TABLET ORAL
Refills: 0 | DISCHARGE

## 2025-05-04 RX ORDER — HEPARIN SODIUM 1000 [USP'U]/ML
800 INJECTION INTRAVENOUS; SUBCUTANEOUS
Qty: 0 | Refills: 0 | DISCHARGE
Start: 2025-05-04

## 2025-05-04 RX ORDER — POLYETHYLENE GLYCOL 3350 17 G/17G
17 POWDER, FOR SOLUTION ORAL DAILY
Refills: 0 | Status: DISCONTINUED | OUTPATIENT
Start: 2025-05-04 | End: 2025-05-08

## 2025-05-04 RX ORDER — BRIMONIDINE TARTRATE 1.5 MG/ML
1 SOLUTION/ DROPS OPHTHALMIC
Refills: 0 | Status: DISCONTINUED | OUTPATIENT
Start: 2025-05-04 | End: 2025-05-08

## 2025-05-04 RX ORDER — ACETAMINOPHEN 500 MG/5ML
1000 LIQUID (ML) ORAL ONCE
Refills: 0 | Status: DISCONTINUED | OUTPATIENT
Start: 2025-05-04 | End: 2025-05-04

## 2025-05-04 RX ORDER — ACETAMINOPHEN 500 MG/5ML
650 LIQUID (ML) ORAL EVERY 6 HOURS
Refills: 0 | Status: DISCONTINUED | OUTPATIENT
Start: 2025-05-04 | End: 2025-05-05

## 2025-05-04 RX ORDER — POVIDONE-IODINE 7.5 %
1 SOLUTION, NON-ORAL TOPICAL ONCE
Refills: 0 | Status: COMPLETED | OUTPATIENT
Start: 2025-05-04 | End: 2025-05-05

## 2025-05-04 RX ORDER — HYDROMORPHONE/SOD CHLOR,ISO/PF 2 MG/10 ML
0.5 SYRINGE (ML) INJECTION EVERY 6 HOURS
Refills: 0 | Status: DISCONTINUED | OUTPATIENT
Start: 2025-05-04 | End: 2025-05-08

## 2025-05-04 RX ADMIN — Medication 1 APPLICATION(S): at 18:35

## 2025-05-04 RX ADMIN — TIMOLOL MALEATE 1 DROP(S): 6.8 SOLUTION OPHTHALMIC at 05:42

## 2025-05-04 RX ADMIN — HEPARIN SODIUM 800 UNIT(S)/HR: 1000 INJECTION INTRAVENOUS; SUBCUTANEOUS at 16:53

## 2025-05-04 RX ADMIN — HEPARIN SODIUM 800 UNIT(S)/HR: 1000 INJECTION INTRAVENOUS; SUBCUTANEOUS at 19:26

## 2025-05-04 RX ADMIN — NEBIVOLOL 2.5 MILLIGRAM(S): 2.5 TABLET ORAL at 05:42

## 2025-05-04 RX ADMIN — HEPARIN SODIUM 800 UNIT(S)/HR: 1000 INJECTION INTRAVENOUS; SUBCUTANEOUS at 20:28

## 2025-05-04 RX ADMIN — HEPARIN SODIUM 900 UNIT(S)/HR: 1000 INJECTION INTRAVENOUS; SUBCUTANEOUS at 23:59

## 2025-05-04 RX ADMIN — HEPARIN SODIUM 800 UNIT(S)/HR: 1000 INJECTION INTRAVENOUS; SUBCUTANEOUS at 11:42

## 2025-05-04 RX ADMIN — BRIMONIDINE TARTRATE 1 DROP(S): 1.5 SOLUTION/ DROPS OPHTHALMIC at 05:42

## 2025-05-04 RX ADMIN — Medication 400 MILLIGRAM(S): at 04:44

## 2025-05-04 RX ADMIN — Medication 1000 MILLIGRAM(S): at 05:20

## 2025-05-04 RX ADMIN — MUPIROCIN CALCIUM 1 APPLICATION(S): 20 CREAM TOPICAL at 18:35

## 2025-05-04 RX ADMIN — LISINOPRIL 10 MILLIGRAM(S): 5 TABLET ORAL at 05:42

## 2025-05-04 NOTE — PROGRESS NOTE ADULT - SUBJECTIVE AND OBJECTIVE BOX
All interim records and events noted.    left femur fx site pain under control, very limited movements  no gross blood loss      MEDICATIONS  (STANDING):  acetaminophen   IVPB .. 1000 milliGRAM(s) IV Intermittent once  brimonidine 0.2% Ophthalmic Solution 1 Drop(s) Right EYE two times a day  heparin  Infusion. 800 Unit(s)/Hr (8 mL/Hr) IV Continuous <Continuous>  lactated ringers. 1000 milliLiter(s) (100 mL/Hr) IV Continuous <Continuous>  lisinopril 10 milliGRAM(s) Oral daily  nebivolol 2.5 milliGRAM(s) Oral daily  senna 2 Tablet(s) Oral at bedtime  sodium chloride 0.9%. 1000 milliLiter(s) (100 mL/Hr) IV Continuous <Continuous>  sodium chloride 0.9%. 1000 milliLiter(s) (100 mL/Hr) IV Continuous <Continuous>  timolol 0.5% Solution 1 Drop(s) Right EYE two times a day    MEDICATIONS  (PRN):  aluminum hydroxide/magnesium hydroxide/simethicone Suspension 30 milliLiter(s) Oral every 4 hours PRN Dyspepsia  heparin   Injectable 4000 Unit(s) IV Push every 6 hours PRN For aPTT less than 40  heparin   Injectable 2000 Unit(s) IV Push every 6 hours PRN For aPTT between 40 - 57  HYDROmorphone  Injectable 0.2 milliGRAM(s) IV Push every 4 hours PRN Moderate Pain (4 - 6)  HYDROmorphone  Injectable 0.5 milliGRAM(s) IV Push every 4 hours PRN Severe Pain (7 - 10)  melatonin 3 milliGRAM(s) Oral at bedtime PRN Insomnia  ondansetron Injectable 4 milliGRAM(s) IV Push every 8 hours PRN Nausea and/or Vomiting  polyethylene glycol 3350 17 Gram(s) Oral daily PRN Constipation      Vital Signs Last 24 Hrs  T(C): 36.8 (04 May 2025 04:14), Max: 37.3 (03 May 2025 20:33)  T(F): 98.3 (04 May 2025 04:14), Max: 99.2 (03 May 2025 20:33)  HR: 80 (04 May 2025 04:14) (76 - 92)  BP: 143/72 (04 May 2025 04:14) (100/56 - 143/72)  BP(mean): --  RR: 17 (04 May 2025 04:14) (17 - 18)  SpO2: 92% (04 May 2025 04:14) (90% - 92%)    Parameters below as of 04 May 2025 04:14  Patient On (Oxygen Delivery Method): room air        PHYSICAL EXAM  General: in no acute distress  Head: atraumatic, normocephalic  ENT: buccal mucosa moist  Neck: supple, trachea midline  CV: S1 S2, regular rate and rhythm  Lungs: clear to auscultation, no wheezes/rhonchi  Abdomen: soft, nontender, bowel sounds present, no palpable masses  Extrem: no clubbing/cyanosis/edema  Skin: no significant increased ecchymosis/petechiae  Neuro: alert and oriented X3,  no focal deficits      LABS:             9.3    9.45  )-----------( 155      ( 05-04 @ 03:35 )             27.9                9.8    10.69 )-----------( 132      ( 05-03 @ 15:40 )             30.1                11.0   10.54 )-----------( 188      ( 05-03 @ 09:00 )             33.3                13.1   13.84 )-----------( 182      ( 05-02 @ 16:11 )             39.4       05-04    144  |  115[H]  |  21  ----------------------------<  132[H]  4.7   |  26  |  0.93    Ca    8.5      04 May 2025 03:35  Mg     1.8     05-04    TPro  5.4[L]  /  Alb  2.5[L]  /  TBili  0.8  /  DBili  x   /  AST  51[H]  /  ALT  43  /  AlkPhos  57  05-04 05-04 @ 03:35  PT12.9 INR1.10  PTT30.8  05-04 @ 00:10  PT-- INR--  PTT92.0  05-03 @ 15:40  PT-- INR--  PTT> 200  05-02 @ 16:11  PT15.1 INR1.28  PTT33.4      RADIOLOGY & ADDITIONAL STUDIES:    IMPRESSION/RECOMMENDATIONS:

## 2025-05-04 NOTE — PROGRESS NOTE ADULT - SUBJECTIVE AND OBJECTIVE BOX
[facility] -- Issac Pena Pannella, Patel, Savella, Goodger, Cohen  Office # [phone]    Follow Up:  cardiac optimization     Subjective/Observations: seen and examined, awake, alert, resting comfortably in bed, denies chest pain, dyspnea, palpitations or dizziness, orthopnea and PND. Tolerating room air. hep gtt  infusing     REVIEW OF SYSTEMS: All other review of systems is negative unless indicated above  PAST MEDICAL & SURGICAL HISTORY:  High cholesterol      HTN (hypertension)      History of fracture of femur        MEDICATIONS  (STANDING):  acetaminophen   IVPB .. 1000 milliGRAM(s) IV Intermittent once  brimonidine 0.2% Ophthalmic Solution 1 Drop(s) Right EYE two times a day  heparin  Infusion.  Unit(s)/Hr (10 mL/Hr) IV Continuous <Continuous>  lactated ringers. 1000 milliLiter(s) (100 mL/Hr) IV Continuous <Continuous>  lisinopril 10 milliGRAM(s) Oral daily  nebivolol 2.5 milliGRAM(s) Oral daily  senna 2 Tablet(s) Oral at bedtime  sodium chloride 0.9%. 1000 milliLiter(s) (100 mL/Hr) IV Continuous <Continuous>  sodium chloride 0.9%. 1000 milliLiter(s) (100 mL/Hr) IV Continuous <Continuous>  timolol 0.5% Solution 1 Drop(s) Right EYE two times a day    MEDICATIONS  (PRN):  aluminum hydroxide/magnesium hydroxide/simethicone Suspension 30 milliLiter(s) Oral every 4 hours PRN Dyspepsia  heparin   Injectable 4000 Unit(s) IV Push every 6 hours PRN For aPTT less than 40  heparin   Injectable 2000 Unit(s) IV Push every 6 hours PRN For aPTT between 40 - 57  HYDROmorphone  Injectable 0.2 milliGRAM(s) IV Push every 4 hours PRN Moderate Pain (4 - 6)  HYDROmorphone  Injectable 0.5 milliGRAM(s) IV Push every 4 hours PRN Severe Pain (7 - 10)  melatonin 3 milliGRAM(s) Oral at bedtime PRN Insomnia  ondansetron Injectable 4 milliGRAM(s) IV Push every 8 hours PRN Nausea and/or Vomiting  polyethylene glycol 3350 17 Gram(s) Oral daily PRN Constipation    Allergies    No Known Allergies    Intolerances    codeine (Headache)  erythromycin (Stomach Upset)    Vital Signs Last 24 Hrs  T(C): 36.8 (04 May 2025 04:14), Max: 37.3 (03 May 2025 20:33)  T(F): 98.3 (04 May 2025 04:14), Max: 99.2 (03 May 2025 20:33)  HR: 80 (04 May 2025 04:14) (76 - 92)  BP: 143/72 (04 May 2025 04:14) (100/56 - 143/72)  BP(mean): --  RR: 17 (04 May 2025 04:14) (17 - 18)  SpO2: 92% (04 May 2025 04:14) (90% - 92%)    Parameters below as of 04 May 2025 04:14  Patient On (Oxygen Delivery Method): room air      I&O's Summary    03 May 2025 07:01  -  04 May 2025 07:00  --------------------------------------------------------  IN: 1140 mL / OUT: 700 mL / NET: 440 mL          TELE: Not on telemetry   PHYSICAL EXAM:  Constitutional: NAD, awake and alert  HEENT: Moist Mucous Membranes, Anicteric  Pulmonary: Non-labored, breath sounds are clear bilaterally, No wheezing, rales or rhonchi  Cardiovascular: IRRR, S1 and S2, +murmurs, rubs, gallops or clicks  Gastrointestinal: Bowel Sounds present, soft, nontender.   Lymph: No peripheral edema. No lymphadenopathy.  Skin: No visible rashes or ulcers.  Psych:  Mood & affect appropriate  LABS: All Labs Reviewed:                        9.3    9.45  )-----------( 155      ( 04 May 2025 03:35 )             27.9                         9.8    10.69 )-----------( 132      ( 03 May 2025 15:40 )             30.1                         11.0   10.54 )-----------( 188      ( 03 May 2025 09:00 )             33.3     04 May 2025 03:35    144    |  115    |  21     ----------------------------<  132    4.7     |  26     |  0.93   03 May 2025 09:00    146    |  114    |  18     ----------------------------<  105    4.9     |  24     |  0.87   02 May 2025 16:11    141    |  109    |  18     ----------------------------<  127    3.6     |  23     |  0.87     Ca    8.5        04 May 2025 03:35  Ca    9.2        03 May 2025 09:00  Ca    9.1        02 May 2025 16:11  Mg     1.8       04 May 2025 03:35    TPro  5.4    /  Alb  2.5    /  TBili  0.8    /  DBili  x      /  AST  51     /  ALT  43     /  AlkPhos  57     04 May 2025 03:35  TPro  5.9    /  Alb  2.9    /  TBili  1.7    /  DBili  x      /  AST  19     /  ALT  20     /  AlkPhos  61     03 May 2025 09:00  TPro  6.4    /  Alb  3.1    /  TBili  1.3    /  DBili  x      /  AST  13     /  ALT  20     /  AlkPhos  66     02 May 2025 16:11    PT/INR - ( 04 May 2025 03:35 )   PT: 12.9 sec;   INR: 1.10 ratio         PTT - ( 04 May 2025 03:35 )  PTT:30.8 sec      12 Lead ECG:   Ventricular Rate 78 BPM    Atrial Rate 78 BPM    P-R Interval 162 ms    QRS Duration 78 ms    Q-T Interval 406 ms    QTC Calculation(Bazett) 462 ms    P Axis 75 degrees    R Axis 77 degrees    T Axis 69 degrees    Diagnosis Line Sinus rhythm with premature atrial complexes  Otherwise normal ECG  No previous ECGs available  Confirmed by sohail Mensah ([id number]) on 5/3/2025 10:28:00 AM (05-03-25 @ 06:10)      TRANSTHORACIC ECHOCARDIOGRAM REPORT  ________________________________________________________________________________                                      _______       Pt. Name:       ELE LUCIO Study Date:    5/3/2025  MRN:            [id number]       YOB: 1935  Accession #:    [id number]      Age:           89 years  Account#:       [id number]     Gender:        F  Heart Rate:                    Height:        66.14 in (168.00 cm)  Rhythm:                        Weight: 119.05 lb (54.00 kg)  Blood Pressure: 147/76 mmHg    BSA/BMI:       1.61 m² / 19.13 kg/m²  ________________________________________________________________________________________  Referring Physician:    [id number] Nuha Markham  Interpreting Physician: Maxwell Mcnamara M.D.  Primary Sonographer:    Sean Krishnamurthy    CPT:               ECHO TTE WO CON COMP W DOPP - 63199.m  Indication(s):     Dyspnea, unspecified - R06.00  Procedure:         Transthoracic echocardiogram with 2-D, M-mode and complete                     spectral and color flow Doppler.  Ordering Location: [facility]  Admission Status:  Inpatient  Study Information: Image quality for this study is technically difficult.    _______________________________________________________________________________________     CONCLUSIONS:      1. Technically difficult image quality.   2. Left ventricular cavity is small.   3. Left ventricular endocardium is not well visualized; however, the left ventricular systolic function appears grossly normal.   4. Normal right ventricular cavity size and normal right ventricular systolic function.   5. Prominent Eustachian valve in the right atrium.   6. There is calcification of the mitral valve annulus.   7. There is focal calcification of the aortic valve leaflets.   8. Estimated pulmonary artery systolic pressure is 26 mmHg.   9. Trace pericardial effusion.    ________________________________________________________________________________________  FINDINGS:     Left Ventricle:  The left ventricular cavity is small. Left ventricular endocardium is not well visualized; however, the left ventricular systolic function appears grossly normal. There is mild (grade 1) left ventricular diastolic dysfunction.     Right Ventricle:  The right ventricular cavity is normal in size and right ventricular systolic function is normal. Tricuspid annular plane systolic excursion (TAPSE) is 2.1 cm (normal >=1.7 cm).     Left Atrium:  The left atrium is normal in size with an indexed volume of 17.11 ml/m².     Right Atrium:  The right atrium was not well visualized. There is a prominent Eustachian valve present, which is a normal variant.     Interatrial Septum:  The interatrial septum appears intact.     Aortic Valve:  The aortic valve anatomy cannot be determined with normal systolic excursion. There is focal calcification of the aortic valve leaflets. There is fibrocalcific aortic valve sclerosis without stenosis. The peak transaortic velocity is 1.42 m/s, peak transaortic gradient is 8.1 mmHg and mean transaortic gradient is 4.0 mmHg with an LVOT/aortic valve VTI ratio of 0.91. There is no evidence of aortic regurgitation.     Mitral Valve:  Structurally normal mitral valve with normal leaflet excursion. There is calcification of the mitral valve annulus. There is mild mitral regurgitation.     Tricuspid Valve:  Structurally normal tricuspid valve with normal leaflet excursion. There is mild tricuspid regurgitation. Estimated pulmonary artery systolic pressure is 26 mmHg.     Pulmonic Valve:  Structurally normal pulmonic valve with normal leaflet excursion.     Aorta:  The aortic root at the sinuses of Valsalva is normal in size, measuring 3.40 cm (indexed 2.12 cm/m²). The ascending aorta is normal in size, measuring 3.20 cm (indexed1.99 cm/m²). The aortic arch diameter is normal in size, measuring 1.8 cm (indexed 1.12 cm/m²).     Pericardium:  There is a trace pericardial effusion.     Systemic Veins:  The inferior vena cava is normal in size measuring 0.98 cm in diameter, (normal <2.1cm) with normal inspiratory collapse (normal >50%) consistent with normal right atrial pressure (~3, range 0-5mmHg).  ____________________________________________________________________  QUANTITATIVE DATA:  Left Ventricle Measurements: (Indexed to BSA)     IVSd (2D):   0.9 cm  LVPWd (2D):  0.9 cm  LVIDd (2D):  3.6 cm  LVIDs (2D):  2.4 cm  LV Mass:     90 g   55.7 g/m²  LV Vol d, MOD A2C: 53.9 ml 33.54 ml/m²  LV Vol d, MOD A4C: 36.5 ml 22.71 ml/m²  LV Vol d, MOD BP:  46.3 ml 28.82 ml/m²  LV Vol s, MOD A2C: 21.5 ml 13.38 ml/m²  LV Vol s, MOD A4C: 14.4 ml 8.96 ml/m²  LV Vol s, MOD BP:  18.5 ml 11.52 ml/m²  LVOT SV MOD BP:    27.8 ml  LV EF% MOD BP:     60 %     MV E Vmax:    0.72 m/s  MV A Vmax:    1.08 m/s  MV E/A:       0.66  e' lateral:   7.72 cm/s  e' medial:    7.72 cm/s  E/e' lateral: 9.30  E/e' medial:  9.30  E/e' Average: 9.30  MV DT:        215 msec    Aorta Measurements: (Normal range) (Indexed to BSA)     Ao Root d     3.40 cm (2.7 - 3.3 cm) 2.12 cm/m²  Ao Asc d, 2D: 3.20  Ao Asc prox:  3.20 cm                1.99 cm/m²  Ao Arch:      1.8 cm            Left Atrium Measurements: (Indexed to BSA)  LA Diam 2D: 3.00 cm         Right Ventricle Measurements:     TAPSE:            2.1 cm  RV Base (RVID1):  3.4 cm  RV Mid (RVID2):   2.5 cm  RV Major (RVID3): 6.1 cm       LVOT / RVOT/ Qp/Qs Data: (Indexed to BSA)  LVOT Diameter,s: 2.00 cm  LVOT Area:       3.14 cm²  LVOT Vmax:       1.25 m/s  LVOT Vmn:        0.823 m/s  LVOT VTI:        24.80 cm  LVOT peak grad:  6 mmHg  LVOT mean grad:  3.0 mmHg  LVOT SV:         77.9 ml   48.49 ml/m²    Aortic Valve Measurements:  AV Vmax:                1.4 m/s  AV Peak Gradient:       8.1 mmHg  AV Mean Gradient:       4.0 mmHg  AV VTI:                 27.3 cm  AV VTI Ratio: 0.91  AoV EOA, Contin:        2.85 cm²  AoV EOA, Contin i:      1.78 cm²/m²  AoV area, (CE):         2.85 cm²  AoV area, (CE), i:      1.78 cm²/m²  AoV Dimensionless Index 0.91    Mitral Valve Measurements:     MV E Vmax: 0.7 m/s         MR Vmax:      1.84 m/s  MV A Vmax: 1.1 m/s         MR Peak Gradient: 13.5 mmHg  MV E/A:    0.7       Tricuspid Valve Measurements:     TR Vmax:          2.4 m/s  TR Peak Gradient: 23.2 mmHg  RA Pressure:      3 mmHg  PASP:             26 mmHg    ________________________________________________________________________________________  Electronically signed on 5/3/2025 at 1:36:21 PM by Maxwell Mcnamara M.D.         *** Final ***

## 2025-05-04 NOTE — H&P ADULT - ASSESSMENT
89yoF w/ PMHx blindness, glaucoma, HTN, atrial flutter and hx PE on eliquis who presents to the ED with L leg pain s/p mechanical fall at home, admitted with L femur fracture.    #left femur fracture   - XR showing acute comminuted displaced supracondylar fracture of the left femur; CT showing L femur non-con showing acute impacted displaced intra-articular fracture of the distal femur with large lipohemarthrosis at the knee. Findings of an underlying possible lesion are present at the site of fracture, with additional findings of abnormal rounded foci of soft tissue within the marrow, suggesting the possibility of underlying metastatic disease. Strongly advise further evaluation with femur MRI  - MRI femur:  Acute, comminuted, intra-articular distal femoral fracture which appears pathologic in etiology with small additional foci of metastatic disease.  - CT C/A/P: 7.5 cm indeterminate mass in the lateral aspect of the right axilla, poorly characterized on this unenhanced CT and suspect for neoplasm. Recommend contrast-enhanced MRI to better evaluate. Indeterminate 15 mm right renal lesion, possibly a hemorrhagic cyst. Recommend further evaluation with targeted ultrasound as neoplasm is also in the differential diagnosis.  Mild fibrotic change in the anterior aspect of the lower lungs.  - ortho consulted recommending transfer to [facility] to have surgical intervention with Dr. Shay Dolan   - pt on eliquis 2.5mg BID for atrial flutter and hx PE, started on heparin gtt for AC with planned procedure  - pain control: tylenol, morphine   - zofran PRN for nausea  - bowel regimen  - Pt denies any CP, SOB, respiratory illness, steroids use. No absolute contraindication from medical perspective to proceed with orthopedic surgery for left femur fracture however patient at elevated risk given advanced age    - RCRI 0, class 1 risk  - cardio consulted at [place]: she is optimized from cardiovascular standpoint to proceed with planned procedure pending TTE  - Echo at [place]: Left ventricular endocardium is not well visualized; however, the left ventricular systolic function appears grossly normal  - ortho consulted, planned for surgery tomorrow, NPO tomorrow, bone specimen at time of left femur surgery    #metastatic disease  - imaging concerning for metastatic disease  - axillary mass noted   - heme/onc consulted, will see if MRI w/ contrast needed to better classify axillary mass  as CT recommends and if R axilla mass biopsy needed   - possible bone specimen at time of L femur surgery tomorrow     #Atrial flutter   - started on hep gtt, holding home eliquis for planned procedure  - continue heparin gtt   - continue home nebivolol with hold parameters  - maintain telemetry     #HTN  - well controlled on arrival  - continue home medications with hold parameters.    #Glaucoma.   - CT HEAD: Possible right globe retinal detachment. Recommend ophthalmologic consultation. No acute intracranial hemorrhage, mass effect, or midline shift. Chronic findings as above.  - pt with blindness follows with ophtho outpatient - hx retinal detachment per outpatient notes  - continue home eye drops  - pt requires assistance with eating    Dispo: acute >4 days, pending surgery and biopsy[facility]    89yoF w/ PMHx blindness, glaucoma, HTN, atrial flutter and hx PE on eliquis who presents to the ED with L leg pain s/p mechanical fall at home, admitted with L femur fracture.    #left femur fracture   - XR showing acute comminuted displaced supracondylar fracture of the left femur; CT showing L femur showing acute impacted displaced intra-articular fracture of the distal femur with large lipohemarthrosis at the knee. Findings of an underlying possible lesion are present at the site of fracture, with additional findings of abnormal rounded foci of soft tissue within the marrow, suggesting the possibility of underlying metastatic disease. Strongly advise further evaluation with femur MRI  - MRI femur: Acute, comminuted, intra-articular distal femoral fracture which appears pathologic in etiology with small additional foci of metastatic disease.  - CT C/A/P: 7.5cm indeterminate mass in the lateral aspect of the right axilla, poorly characterized on this unenhanced CT and suspect for neoplasm. Recommend contrast-enhanced MRI to better evaluate. Indeterminate 15 mm right renal lesion, possibly a hemorrhagic cyst. Recommend further evaluation with targeted ultrasound as neoplasm is also in the differential diagnosis.  Mild fibrotic change in the anterior aspect of the lower lungs.  - ortho consulted recommending transfer to [facility] to have surgical intervention with Dr. Shay Dolan   - pt on eliquis 2.5mg BID for atrial flutter and hx PE, started on heparin gtt for AC given planned procedure  - pain control: tylenol, dilaudid  - zofran PRN for nausea  - bowel regimen  - Pt denies any CP, SOB, respiratory illness, steroids use. Hemodynamically stable, on RA. No absolute contraindication from medical perspective to proceed with orthopedic surgery for left femur fracture however patient at elevated risk given advanced age    - cardio consulted at [place]: she is optimized from cardiovascular standpoint to proceed with planned procedure pending TTE  - Echo at [place]: Left ventricular endocardium is not well visualized; however, the left ventricular systolic function appears grossly normal  - ortho consulted, planned for surgery tomorrow, NPO tomorrow, bone specimen at time of left femur surgery    #metastatic disease  - imaging concerning for metastatic disease  - axillary mass noted on CT   - heme/onc consult, will see if MRI w/ contrast needed to better classify axillary mass as CT recommends and if R axilla mass biopsy needed   - possible bone specimen at time of L femur surgery tomorrow   - PT post surgery     #renal lesion  - possible hemorrhagic cyst, seen on CT   - plan for u/s after surgery as patient having a lot of pain with any movement at this time     #Atrial flutter   - started on hep gtt, holding home eliquis for planned procedure  - continue heparin gtt   - home nebivolol -> atenolol with hold parameters  - maintain telemetry     #HTN  - well controlled currently   - home ramipril -> lisinopril with hold parameters.    #Glaucoma.   - CT HEAD: Possible right globe retinal detachment. Recommend ophthalmologic consultation. No acute intracranial hemorrhage, mass effect, or midline shift. Chronic findings as above.  - pt with blindness follows with ophtho outpatient - hx retinal detachment per outpatient notes  - continue home eye drops  - pt requires assistance with eating    Dispo: acute >4 days, pending surgery and biopsy; possible transfer back to [facility] post surgery   HCP: Caren daughter who patient lives with

## 2025-05-04 NOTE — PROGRESS NOTE ADULT - ASSESSMENT
89 yo F with PMHx of HTN, Blindness, Glaucoma, Atrial flutter and remote hx of PE on Eliquis who presents to the ED with L leg pain s/p mechanical fall at home, found to have Femur Fracture    Cardiac optimization  - s/p MF at home sustaining a femur, plan for operative fixation of distal femur fracture at [facility] pending transfer, ortho following     - EKG: Sinus rhythm with PAC 78 bpm   - No prior EKG to compare.   - No anginal complaints    - Known hx of Atrial Flutter   - Elevated blood pressure on admission likely 2/2 pain, improved  - BP, HR stable and controlled per flow sheet  - Continue home ACEi and BB   - Monitor and replete Lytes. Keep K > 4 and Mg > 2    - No meaningful evidence of volume overload.  - Systolic murmur appreciated on examination   - TTE (5/3/2025) TDS LV cavity small, LVSF normal . Prominent Eustachian valve in the right atrium. There is calcification of the mitral valve annulus.    - Pt has no active ischemia, decompensated heart failure, unstable arrythmia, or severe stenotic valvular disease, and has minimal cardiac risk factors. Pt with METs >4 In the setting of intermediate risk Orthopedic Surgery, she is optimized from cardiovascular standpoint to proceed with planned procedure pending TTE.    - Will continue to follow.    Sherri Alanis [facility]  Nurse Practitioner - Cardiology   call TEAMS    
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 Fell in bathroom adm w extensive complicated fx left distal femur. Also 7,5cm partially calcified mass lat to right axilla  CT and MRI of left femur suspicious for pathological fx w abnormal bone/marrow findings at site of fx.  CT head, c/a/p aside from the lateral to right axilla mass no significant findings (nonspecific 15mm right renal hyperattuated lesion, left adenxal 1.3cm simple cyst. fatty atrophy left thigh w extensive edema/left thigh likely assoc w the complicated fx althgouh can't r/0 additional muscle/soft tissue pathology in region.  Labs essentialy normal CBC. CMP w sl incr of total Bili ?related to the extensive local trauma/hematoma    -bone findings felt suspicious for met/pathological fx. The mass post to right axilla may be separate issue, in either case needs tissue for diagnosis  -bone specimen at time of left femur surgery. Also consider biopsy of the right axilla region mass  -for transfer to [place] today for definitive left femur repair    further Heme/Onc recommendations pending final pathology  discussed w pt  discussed w Medicine
Pt is a 89y/o with PMHx blindness, glaucoma, HTN, atrial flutter and hx PE on eliquis who presents to the ED with L leg pain s/p mechanical fall at home, admitted with L femur fracture.       Problem/Plan - 1:  ·  Problem: Fracture of left femur.   ·  Plan: Pt presenting with LLE pain s/p mechanical fall at home  - VSS on arrival  - labs showing leukocytosis likely reactive  - XR showing acute comminuted displaced supracondylar fracture of the left femur  - CT L femur non-con showing acute impacted displaced intra-articular fracture of the distal femur with large lipohemarthrosis at the knee. Findings of an underlying possible lesion are present at the site of fracture, with additional findings of abnormal rounded foci of soft tissue within the marrow, suggesting the possibility of underlying metastatic disease. Strongly advise further evaluation with femur MRI.  - MRI femur:  Acute, comminuted, intra-articular distal femoral fracture which appears pathologic in etiology with small additional foci of metastatic disease.  - CT C/A/P: 7.5 cm indeterminate mass in the lateral aspect of the right axilla, poorly characterized on this unenhanced CT and suspect for neoplasm. Recommend contrast-enhanced MRI to better evaluate.  Indeterminate 15 mm right renal lesion, possibly a hemorrhagic cyst. Recommend further evaluation with targeted ultrasound as neoplasm is also in the differential diagnosis.  Mild fibrotic change in the anterior aspect of the lower lungs.  - s/p dilaudid 0.5mg IV x2, Zofran, ofirmev x1 in ED  - ortho consulted recommending transfer to [facility] to have surgical intervention with Dr. Shay Dolan   - pt on eliquis 2.5mg BID for atrial flutter and hx PE will start heparin gtt for AC with planned procedure  - pain control with IV tylenol, dilaudid 0.2mg q4h for moderate, dilaudid 0.5mg for severe  - zofran PRN for nausea  - bowel regimen  - Pt has no active ischemia, decompensated heart failure, or unstable arrythmia; she has no history of kidney disease and no prior anesthesia reactions.  No absolute contraindication from medical perspective to proceed with orthopedic surgery for left femur fracture.  Hopefully can obtained tissue sample/bone specimen during time of surgery.   - RCRI 0, class 1 risk  - medical optimization  - systolic murmur on auscultation f/u tte:  Left ventricular endocardium is not well visualized; however, the left ventricular systolic function appears grossly normal.  - cardio consult appreciated     Problem/Plan - 2:  ·  Problem: Atrial flutter.   ·  Plan: PT/INR 15.1/1.28 on arrival  - hold home eliquis for planned procedure  - continue heparin gtt   - continue home nebivolol with hold parameters  - cardio consult.     Problem/Plan - 3:  ·  Problem: HTN (hypertension).   ·  Plan: Chronic  - well controlled on arrival  - continue home medications with hold parameters.     Problem/Plan - 4:  ·  Problem: Glaucoma.   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 Recommend ophthalmologic consultation. No acute intracranial hemorrhage, mass effect, or midline shift. Chronic findings as above.  - pt with blindness follows with ophtho outpatient - hx retinal detachment per outpatient notes  - continue home eye drops  - pt requires assistance with eating.     Problem/Plan - 5:  ·  Problem: Need for prophylactic measure.   ·  Plan: Heparin gtt.    ---  TIME SPENT:  55 minutes spent on total encounter. The necessity of the time spent during the encounter on this date of service was due to:     direct patient care including but not limited to reviewing chart, medications ,laboratory data, imaging reports, discussion of plan of care with consultants on the case, coordination of care with multidisciplinary team involved in the case and discussion of plan with patient.  Patient agreeable to plan of care and verbalized understanding the anticipated hospital course and treatment plan.    ---    
Pt is a 88y/o with PMHx blindness, glaucoma, HTN, atrial flutter and hx PE on eliquis who presents to the ED with L leg pain s/p mechanical fall at home, admitted with L femur fracture.       Problem/Plan - 1:  ·  Problem: Fracture of left femur.   ·  Plan: Pt presenting with LLE pain s/p mechanical fall at home  - VSS on arrival  - labs showing leukocytosis likely reactive  - XR showing acute comminuted displaced supracondylar fracture of the left femur  - CT L femur non-con showing acute impacted displaced intra-articular fracture of the distal femur with large lipohemarthrosis at the knee. Findings of an underlying possible lesion are present at the site of fracture, with additional findings of abnormal rounded foci of soft tissue within the marrow, suggesting the possibility of underlying metastatic disease. Strongly advise further evaluation with femur MRI.  - Check MRI femur and CT C/A/P  - s/p dilaudid 0.5mg IV x2, Zofran, ofirmev x1 in ED  - ortho consulted in ED, patient is planned for possible surgical intervention Sunday pending optimization and findings on CT/MRI femur   - ortho team to f/u regarding MR femur   - pt on eliquis 2.5mg BID for atrial flutter and hx PE will start heparin gtt for AC with planned procedure  - pain control with IV tylenol x3 doses standing, dilaudid 0.2mg q4h for moderate, dilaudid 0.5mg for severe  - zofran PRN for nausea, f/u EKG for qtc  - bowel regimen  - Pt has no active ischemia, decompensated heart failure, or unstable arrythmia; she has no history of kidney disease and no prior anesthesia reactions.  No absolute contraindication from medical perspective to proceed with orthopedic surgery for left femur fracture.   - RCRI 0, class 1 risk  - medical optimization  - systolic murmur on auscultation f/u tte  - cardio consult appreciated     Problem/Plan - 2:  ·  Problem: Atrial flutter.   ·  Plan: PT/INR 15.1/1.28 on arrival  - hold home eliquis for planned procedure  - will start heparin gtt 12 hours s/p her last dose of eliquis  - continue home nebivolol with hold parameters  - cardio consult.     Problem/Plan - 3:  ·  Problem: HTN (hypertension).   ·  Plan: Chronic  - well controlled on arrival  - continue home medications with hold parameters.     Problem/Plan - 4:  ·  Problem: Glaucoma.   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 Recommend ophthalmologic consultation. No acute intracranial hemorrhage, mass effect, or midline shift. Chronic findings as above.  - pt with blindess follows with ophtho outpatient - hx retinal detachment per outpatient notes  - continue home eye drops  - pt requires assistance with eating.     Problem/Plan - 5:  ·  Problem: Need for prophylactic measure.   ·  Plan: Heparin gtt.    ---  TIME SPENT:  51 minutes spent on total encounter. The necessity of the time spent during the encounter on this date of service was due to:     direct patient care including but not limited to reviewing chart, medications ,laboratory data, imaging reports, discussion of plan of care with consultants on the case, coordination of care with multidisciplinary team involved in the case and discussion of plan with patient.  Patient agreeable to plan of care and verbalized understanding the anticipated hospital course and treatment plan.    ---

## 2025-05-04 NOTE — CONSULT NOTE ADULT - SUBJECTIVE AND OBJECTIVE BOX
Patient is an 89yoF transferred from [facility] with left distal femur fx. She is status post mechanical fall at home on 5/2. Patient ambulates with walker and person guidance assistance baseline as she is legally blind. States slipped and fell on loose carpet at home causing immediate LLE pain and inability to bear weight. X-ray, CT, and MRI were obtained at [facility] with concern for pathologic fx and metastatic disease. CT A/P also showing axillary mass. Patient was evaluated by Heme/onc, cardiology and medicine in [place] for surgical rec and clearence. She is currently in a bulky france knee immobilzer. Reports knee pain if extremity is moved, otherwise pain is currently controlled.    PAST MEDICAL & SURGICAL HISTORY:  High cholesterol  HTN (hypertension)  History of left hip fx s/p IM nail    ALLERGIES:  erythromycin (Stomach Upset)  codeine (Headache)    SOCIAL HISTORY:  Live in residence with daughter and son in law  denies nicotine use  denies alcohol or illicit drug abuse    REVIEW OF SYSTEMS:  Musculoskeletal: left knee pain  all other pertinent ROS are negative      PHYSICAL EXAM: NAD, appears comfortable in bed. AAO  LLE:  pt in well positioned and padded bulky france KI  distal motor intact: + dorsi/plantar flexion. able to wiggle toes  gross sensation is intact  calves soft NT b/l LE  pulses appreciated      Xray L femur:   Acute comminuted displaced supracondylar fracture of the distal left   femur     MRI LLE:   Acute, comminuted, intra-articular distal femoral fracture which appears   pathologic in etiology with small additional foci of metastatic disease.    CT Left femur:  Acute impacted displaced intra-articular fracture of the distal femur   with large lipohemarthrosis at the knee. Findings of an underlying   possible lesion are present at the site of fracture, with additional   findings of abnormal rounded foci of soft tissue within the marrow,   suggesting the possibility of underlying metastatic disease      A/P 88 y/o Female with left distal femur fx with concern for pathological fx  patient admitted to medicine team    -Plan for OR with Dr Dolan tomorrow morning  -b/l dopplers  -heme/onc, cardilogy, and med recs and sx clearence appreciated  -NPO after midnight  -ABX on call to OR  -Hold anticoagulation for OR  -maintain LLE immobilization  -continue care with primary team       Patient is an 89yoF transferred from [facility] with left distal femur fx. She is status post mechanical fall at home on 5/2. Patient ambulates with walker and person guidance assistance baseline as she is legally blind. States slipped and fell on loose carpet at home causing immediate LLE pain and inability to bear weight. X-ray, CT, and MRI were obtained at [facility] with concern for pathologic fx and metastatic disease. CT A/P also showing axillary mass. Patient was evaluated by Heme/onc, cardiology and medicine in [place] for surgical rec and clearance She is currently in a bulky france knee immobilizer Reports knee pain if extremity is moved, otherwise pain is currently controlled.    PAST MEDICAL & SURGICAL HISTORY:  High cholesterol  HTN (hypertension)  History of left hip fx s/p IM nail    ALLERGIES:  erythromycin (Stomach Upset)  codeine (Headache)    SOCIAL HISTORY:  Live in residence with daughter and son in law  denies nicotine use  denies alcohol or illicit drug abuse    REVIEW OF SYSTEMS:  Musculoskeletal: left knee pain  all other pertinent ROS are negative      PHYSICAL EXAM: NAD, appears comfortable in bed. AAO  LLE:  pt in well positioned and padded bulky france KI  distal motor intact: + dorsi/plantar flexion. able to wiggle toes  gross sensation is intact  calves soft NT b/l LE  pulses appreciated      Xray L femur:   Acute comminuted displaced supracondylar fracture of the distal left   femur     MRI LLE:   Acute, comminuted, intra-articular distal femoral fracture which appears   pathologic in etiology with small additional foci of metastatic disease.    CT Left femur:  Acute impacted displaced intra-articular fracture of the distal femur   with large lipohemarthrosis at the knee. Findings of an underlying   possible lesion are present at the site of fracture, with additional   findings of abnormal rounded foci of soft tissue within the marrow,   suggesting the possibility of underlying metastatic disease      A/P 89 y/o Female with left distal femur fx with concern for pathological fx  patient admitted to medicine team    -Plan for OR with Dr Dolan tomorrow morning  -b/l dopplers  -heme/onc, cardiology and med recs and sx clearance appreciated  -NPO after midnight  -ABX on call to OR  -Hold anticoagulation for OR  -maintain LLE immobilization  -continue care with primary team

## 2025-05-04 NOTE — H&P ADULT - HISTORY OF PRESENT ILLNESS
89yoF w/ PMHx blindness, glaucoma, HTN, atrial flutter and hx PE on eliquis who presents to the ED with L leg pain s/p mechanical fall at home presented to [place] ED w/ L leg pain s/p mechanical fall at home. X-ray, CT, and MRI done. Concern for pathologic fx, concern for metastatic disease. CT A/P showing axillary mass. Heme/onc and ortho consulted at [facility]. Patient transferred to [facility]. Dr. Dolan accepted patient for surgery. Plan for surgery tomorrow. Patient on hep gtt in preparation for surgery. Patient denies fever, chills, CP, sob, cough, abd pain, n/v, constipation, diarrhea.  89yoF w/ PMHx blindness, glaucoma, retinal detachment, HTN, atrial flutter and hx PE on eliquis who presents to the ED with L leg pain s/p mechanical fall at home presented to [place] ED w/ L leg pain s/p mechanical fall at home. X-ray, CT, and MRI done. Concern for pathologic fx, concern for metastatic disease. CT A/P showing axillary mass. Heme/onc and ortho consulted at \[facility]". Patient transferred to [facility]. Dr. Dolan accepted patient for surgery. Plan for surgery tomorrow. Patient on hep gtt in preparation for surgery. Daughter Caren and Son in law Rich at bedside, Patient denies fever, chills, CP, sob, cough, abd pain, n/v, constipation, diarrhea. Patient reports no leg pain while laying still, has pain with any movement. Reports dilaudid causes some nausea, zofran helps.

## 2025-05-04 NOTE — H&P ADULT - TIME BILLING
reviewing the chart documentation, reviewing labs and imaging studies, evaluating the patient, discussing the plan of care with the consultants & medical team, and documenting.

## 2025-05-04 NOTE — PROGRESS NOTE ADULT - NS ATTEND AMEND GEN_ALL_CORE FT
89 yo F with HTN, Blindness, Glaucoma, Atrial flutter and remote hx of PE on Eliquis who presents to the ED with L leg pain s/p mechanical fall at home, found to have Femur Fracture, consulted for cardiac clearance.     - has been doing well from cv standpoint  - history of atrial flutter, though in sr without worrisome findings  - eliquis on hold for now, and on heparin gtt in short term  - cont bb  - echocardiogram with grossly normal lv function  - no suggestion of volume overload  - bp elevated in setting of pain. cont with ace inhibitor  - no cardiac contraindication to proceeding to OR. Routine cardiac monitoring is recommended.   - will follow with you .

## 2025-05-04 NOTE — PROGRESS NOTE ADULT - REASON FOR ADMISSION
L femur fracture

## 2025-05-04 NOTE — H&P ADULT - NSHPPHYSICALEXAM_GEN_ALL_CORE
CONSTITUTIONAL: Well groomed, no apparent distress  HEENT: NCAT, EOMI  RESP: No respiratory distress, CTA b/l  CV: RRR, no peripheral edema  GI: Soft, NT, ND  NEURO: CN II-XII intact  PSYCH: Appropriate insight/judgment; A+O x4

## 2025-05-04 NOTE — PROGRESS NOTE ADULT - SUBJECTIVE AND OBJECTIVE BOX
CHIEF COMPLAINT/INTERVAL HISTORY:  Pt. seen and evaluated for pathologic left femur fracture.  Pt. is in no distress.  She does report having some pain from fracture.  Denies having CP or SOB.  Appreciated orthopedic input and recommendations for transfer to [facility].      REVIEW OF SYSTEMS:  No fever, CP, SOB, or abdominal pain     Vital Signs Last 24 Hrs  T(C): 36.8 (04 May 2025 04:14), Max: 37.3 (03 May 2025 20:33)  T(F): 98.3 (04 May 2025 04:14), Max: 99.2 (03 May 2025 20:33)  HR: 80 (04 May 2025 04:14) (76 - 92)  BP: 143/72 (04 May 2025 04:14) (100/56 - 143/72)  BP(mean): --  RR: 17 (04 May 2025 04:14) (17 - 18)  SpO2: 92% (04 May 2025 04:14) (90% - 92%)    Parameters below as of 04 May 2025 04:14  Patient On (Oxygen Delivery Method): room air        PHYSICAL EXAM:  GENERAL: NAD  HEENT: blind, hearing normal  Chest: CTA bilaterally, no wheezing  CV: S1S2, RRR,   GI: soft, +BS, NT/ND  Musculoskeletal: LLE in ACE wrap and brace  Psychiatric: affect nL, mood nL  Skin: warm and dry    LABS:                        9.3    9.45  )-----------( 155      ( 04 May 2025 03:35 )             27.9     05-04    144  |  115[H]  |  21  ----------------------------<  132[H]  4.7   |  26  |  0.93    Ca    8.5      04 May 2025 03:35  Mg     1.8     05-04    TPro  5.4[L]  /  Alb  2.5[L]  /  TBili  0.8  /  DBili  x   /  AST  51[H]  /  ALT  43  /  AlkPhos  57  05-04    PT/INR - ( 04 May 2025 03:35 )   PT: 12.9 sec;   INR: 1.10 ratio         PTT - ( 04 May 2025 03:35 )  PTT:30.8 sec  Urinalysis Basic - ( 04 May 2025 03:35 )    Color: x / Appearance: x / SG: x / pH: x  Gluc: 132 mg/dL / Ketone: x  / Bili: x / Urobili: x   Blood: x / Protein: x / Nitrite: x   Leuk Esterase: x / RBC: x / WBC x   Sq Epi: x / Non Sq Epi: x / Bacteria: x

## 2025-05-04 NOTE — PROGRESS NOTE ADULT - SUBJECTIVE AND OBJECTIVE BOX
Patient seen and examined at bedside. Had some pain overnight that was relieved by ofirmev and improved this morning. BJKI in place, patient comfortable. Denies numbness, tingling or weakness. Denies nausea, vomiting, chest pain, SOB, headache.       VITAL SIGNS:  T(C): 36.8 (05-04-25 @ 04:14), Max: 37.3 (05-03-25 @ 20:33)  HR: 80 (05-04-25 @ 04:14) (76 - 92)  BP: 143/72 (05-04-25 @ 04:14) (100/56 - 143/72)  RR: 17 (05-04-25 @ 04:14) (17 - 18)  SpO2: 92% (05-04-25 @ 04:14) (90% - 92%)    PHYSICAL EXAMINATION  Gen: NAD, Resting comfortably    LLE:  BJKI in place   TTP by distal femur and knee area, nowhere else along RLE  Motor: + EHL/FHL/TA/GSC  Sensory: +SILT: SPN/DPN/JUAN CARLOS/SAPH/TIB  Compartments soft and compressible  No calf tenderness  Dopplerable DP pulse, + PT    LABS:                        9.3    9.45  )-----------( 155      ( 04 May 2025 03:35 )             27.9     05-04    144  |  115[H]  |  21  ----------------------------<  132[H]  4.7   |  26  |  0.93    Ca    8.5      04 May 2025 03:35  Mg     1.8     05-04    TPro  5.4[L]  /  Alb  2.5[L]  /  TBili  0.8  /  DBili  x   /  AST  51[H]  /  ALT  43  /  AlkPhos  57  05-04    PT/INR - ( 04 May 2025 03:35 )   PT: 12.9 sec;   INR: 1.10 ratio         PTT - ( 04 May 2025 03:35 )  PTT:30.8 sec  Urinalysis Basic - ( 04 May 2025 03:35 )    Color: x / Appearance: x / SG: x / pH: x  Gluc: 132 mg/dL / Ketone: x  / Bili: x / Urobili: x   Blood: x / Protein: x / Nitrite: x   Leuk Esterase: x / RBC: x / WBC x   Sq Epi: x / Non Sq Epi: x / Bacteria: x    A/P: 89yFemale with left distal femur fracture and possible bony metastasis.    NWB LLE IN BJKI at all times, strict bed rest  Discussed case with patient and daughter (Caren) via telephone, due to complexity of trauma and overlying concern for pathologic fracture secondary to malignancy may consider transfer to [facility] today for operative fixation with orthopedic traumatologist and further malignancy workup  CT CAP, FU MR L Femur w/wo IV contrast concerning for L axillary mass as well as bony metastatic lesions  Please hold heparin and keep NPO except medications pending final plan  IVF while NPO  Appreciate documentation of medical and cardiac optimization   Heme/Onc recommendations appreciated  Pain Control As Needed  Ice hip as tolerated  Finalization of plan pending further discussion with Dr Groves

## 2025-05-04 NOTE — CHART NOTE - NSCHARTNOTEFT_GEN_A_CORE
After lengthy discussion with Dr Groves and Dr Dolan at [facility], all parties agree that patient would benefit from higher level of care and operative fixation by traumatologist, Dr Shay Dolan. Discussed as well with patient and daughter (Caren Pastrana) who wish to pursue operative treatment, are aware and agree with plan.    - Please initiate transfer to [facility], Dr Dolan to be accepting consulting orthopedic surgeon  - Patient may resume diet and DVT ppx per primary team today (5/4/25) as surgery will not occur until at least tomorrow morning  - NWB LLE in BJKI  - Pain control as needed  - Appreciate documentation of medical and cardiac clearance  - Appreciate medical management  - Discussed with Dr Groves and Dr Dolan who are in agreement with plan

## 2025-05-05 ENCOUNTER — TRANSCRIPTION ENCOUNTER (OUTPATIENT)
Age: 89
End: 2025-05-05

## 2025-05-05 ENCOUNTER — RESULT REVIEW (OUTPATIENT)
Age: 89
End: 2025-05-05

## 2025-05-05 LAB
ALBUMIN SERPL ELPH-MCNC: 2.9 G/DL — LOW (ref 3.3–5.2)
ALP SERPL-CCNC: 76 U/L — SIGNIFICANT CHANGE UP (ref 40–120)
ALT FLD-CCNC: 34 U/L — HIGH
ANION GAP SERPL CALC-SCNC: 11 MMOL/L — SIGNIFICANT CHANGE UP (ref 5–17)
ANION GAP SERPL CALC-SCNC: 12 MMOL/L — SIGNIFICANT CHANGE UP (ref 5–17)
APTT BLD: 29.3 SEC — SIGNIFICANT CHANGE UP (ref 26.1–36.8)
AST SERPL-CCNC: 32 U/L — HIGH
BILIRUB SERPL-MCNC: 0.8 MG/DL — SIGNIFICANT CHANGE UP (ref 0.4–2)
BLD GP AB SCN SERPL QL: SIGNIFICANT CHANGE UP
BUN SERPL-MCNC: 16.5 MG/DL — SIGNIFICANT CHANGE UP (ref 8–20)
BUN SERPL-MCNC: 19.8 MG/DL — SIGNIFICANT CHANGE UP (ref 8–20)
CALCIUM SERPL-MCNC: 7.6 MG/DL — LOW (ref 8.4–10.5)
CALCIUM SERPL-MCNC: 8.2 MG/DL — LOW (ref 8.4–10.5)
CHLORIDE SERPL-SCNC: 105 MMOL/L — SIGNIFICANT CHANGE UP (ref 96–108)
CHLORIDE SERPL-SCNC: 106 MMOL/L — SIGNIFICANT CHANGE UP (ref 96–108)
CO2 SERPL-SCNC: 19 MMOL/L — LOW (ref 22–29)
CO2 SERPL-SCNC: 22 MMOL/L — SIGNIFICANT CHANGE UP (ref 22–29)
CREAT SERPL-MCNC: 0.57 MG/DL — SIGNIFICANT CHANGE UP (ref 0.5–1.3)
CREAT SERPL-MCNC: 0.72 MG/DL — SIGNIFICANT CHANGE UP (ref 0.5–1.3)
EGFR: 80 ML/MIN/1.73M2 — SIGNIFICANT CHANGE UP
EGFR: 80 ML/MIN/1.73M2 — SIGNIFICANT CHANGE UP
EGFR: 87 ML/MIN/1.73M2 — SIGNIFICANT CHANGE UP
EGFR: 87 ML/MIN/1.73M2 — SIGNIFICANT CHANGE UP
GLUCOSE SERPL-MCNC: 117 MG/DL — HIGH (ref 70–99)
GLUCOSE SERPL-MCNC: 154 MG/DL — HIGH (ref 70–99)
HCT VFR BLD CALC: 24.7 % — LOW (ref 34.5–45)
HCT VFR BLD CALC: 26.2 % — LOW (ref 34.5–45)
HGB BLD-MCNC: 8.3 G/DL — LOW (ref 11.5–15.5)
HGB BLD-MCNC: 8.4 G/DL — LOW (ref 11.5–15.5)
MAGNESIUM SERPL-MCNC: 1.5 MG/DL — LOW (ref 1.6–2.6)
MCHC RBC-ENTMCNC: 30.4 PG — SIGNIFICANT CHANGE UP (ref 27–34)
MCHC RBC-ENTMCNC: 31 PG — SIGNIFICANT CHANGE UP (ref 27–34)
MCHC RBC-ENTMCNC: 32.1 G/DL — SIGNIFICANT CHANGE UP (ref 32–36)
MCHC RBC-ENTMCNC: 33.6 G/DL — SIGNIFICANT CHANGE UP (ref 32–36)
MCV RBC AUTO: 92.2 FL — SIGNIFICANT CHANGE UP (ref 80–100)
MCV RBC AUTO: 94.9 FL — SIGNIFICANT CHANGE UP (ref 80–100)
NRBC # BLD AUTO: 0 K/UL — SIGNIFICANT CHANGE UP (ref 0–0)
NRBC # BLD AUTO: 0 K/UL — SIGNIFICANT CHANGE UP (ref 0–0)
NRBC # FLD: 0 K/UL — SIGNIFICANT CHANGE UP (ref 0–0)
NRBC # FLD: 0 K/UL — SIGNIFICANT CHANGE UP (ref 0–0)
NRBC BLD AUTO-RTO: 0 /100 WBCS — SIGNIFICANT CHANGE UP (ref 0–0)
NRBC BLD AUTO-RTO: 0 /100 WBCS — SIGNIFICANT CHANGE UP (ref 0–0)
PHOSPHATE SERPL-MCNC: 2.1 MG/DL — LOW (ref 2.4–4.7)
PLATELET # BLD AUTO: 142 K/UL — LOW (ref 150–400)
PLATELET # BLD AUTO: 169 K/UL — SIGNIFICANT CHANGE UP (ref 150–400)
PMV BLD: 10.5 FL — SIGNIFICANT CHANGE UP (ref 7–13)
PMV BLD: 10.6 FL — SIGNIFICANT CHANGE UP (ref 7–13)
POTASSIUM SERPL-MCNC: 4 MMOL/L — SIGNIFICANT CHANGE UP (ref 3.5–5.3)
POTASSIUM SERPL-MCNC: 4.2 MMOL/L — SIGNIFICANT CHANGE UP (ref 3.5–5.3)
POTASSIUM SERPL-SCNC: 4 MMOL/L — SIGNIFICANT CHANGE UP (ref 3.5–5.3)
POTASSIUM SERPL-SCNC: 4.2 MMOL/L — SIGNIFICANT CHANGE UP (ref 3.5–5.3)
PROT SERPL-MCNC: 5.4 G/DL — LOW (ref 6.6–8.7)
RBC # BLD: 2.68 M/UL — LOW (ref 3.8–5.2)
RBC # BLD: 2.76 M/UL — LOW (ref 3.8–5.2)
RBC # FLD: 14 % — SIGNIFICANT CHANGE UP (ref 10.3–14.5)
RBC # FLD: 14.3 % — SIGNIFICANT CHANGE UP (ref 10.3–14.5)
SODIUM SERPL-SCNC: 135 MMOL/L — SIGNIFICANT CHANGE UP (ref 135–145)
SODIUM SERPL-SCNC: 139 MMOL/L — SIGNIFICANT CHANGE UP (ref 135–145)
WBC # BLD: 10.11 K/UL — SIGNIFICANT CHANGE UP (ref 3.8–10.5)
WBC # BLD: 11.93 K/UL — HIGH (ref 3.8–10.5)
WBC # FLD AUTO: 10.11 K/UL — SIGNIFICANT CHANGE UP (ref 3.8–10.5)
WBC # FLD AUTO: 11.93 K/UL — HIGH (ref 3.8–10.5)

## 2025-05-05 PROCEDURE — 76376 3D RENDER W/INTRP POSTPROCES: CPT

## 2025-05-05 PROCEDURE — 73552 X-RAY EXAM OF FEMUR 2/>: CPT | Mod: 26,LT

## 2025-05-05 PROCEDURE — 93306 TTE W/DOPPLER COMPLETE: CPT

## 2025-05-05 PROCEDURE — 27506 TREATMENT OF THIGH FRACTURE: CPT | Mod: LT

## 2025-05-05 PROCEDURE — 84155 ASSAY OF PROTEIN SERUM: CPT

## 2025-05-05 PROCEDURE — 93010 ELECTROCARDIOGRAM REPORT: CPT

## 2025-05-05 PROCEDURE — 86923 COMPATIBILITY TEST ELECTRIC: CPT

## 2025-05-05 PROCEDURE — 27511 TREATMENT OF THIGH FRACTURE: CPT | Mod: LT

## 2025-05-05 PROCEDURE — 73502 X-RAY EXAM HIP UNI 2-3 VIEWS: CPT

## 2025-05-05 PROCEDURE — 86901 BLOOD TYPING SEROLOGIC RH(D): CPT

## 2025-05-05 PROCEDURE — 74176 CT ABD & PELVIS W/O CONTRAST: CPT | Mod: MC

## 2025-05-05 PROCEDURE — 71045 X-RAY EXAM CHEST 1 VIEW: CPT

## 2025-05-05 PROCEDURE — 88305 TISSUE EXAM BY PATHOLOGIST: CPT | Mod: 26

## 2025-05-05 PROCEDURE — 72125 CT NECK SPINE W/O DYE: CPT | Mod: MC

## 2025-05-05 PROCEDURE — 99233 SBSQ HOSP IP/OBS HIGH 50: CPT

## 2025-05-05 PROCEDURE — 99285 EMERGENCY DEPT VISIT HI MDM: CPT

## 2025-05-05 PROCEDURE — 86334 IMMUNOFIX E-PHORESIS SERUM: CPT

## 2025-05-05 PROCEDURE — 83615 LACTATE (LD) (LDH) ENZYME: CPT

## 2025-05-05 PROCEDURE — 93005 ELECTROCARDIOGRAM TRACING: CPT

## 2025-05-05 PROCEDURE — 71250 CT THORAX DX C-: CPT | Mod: MC

## 2025-05-05 PROCEDURE — 36415 COLL VENOUS BLD VENIPUNCTURE: CPT

## 2025-05-05 PROCEDURE — 85730 THROMBOPLASTIN TIME PARTIAL: CPT

## 2025-05-05 PROCEDURE — 83735 ASSAY OF MAGNESIUM: CPT

## 2025-05-05 PROCEDURE — 85610 PROTHROMBIN TIME: CPT

## 2025-05-05 PROCEDURE — 70450 CT HEAD/BRAIN W/O DYE: CPT | Mod: MC

## 2025-05-05 PROCEDURE — 85025 COMPLETE CBC W/AUTO DIFF WBC: CPT

## 2025-05-05 PROCEDURE — 96376 TX/PRO/DX INJ SAME DRUG ADON: CPT

## 2025-05-05 PROCEDURE — 73720 MRI LWR EXTREMITY W/O&W/DYE: CPT | Mod: MC

## 2025-05-05 PROCEDURE — 85027 COMPLETE CBC AUTOMATED: CPT

## 2025-05-05 PROCEDURE — 86850 RBC ANTIBODY SCREEN: CPT

## 2025-05-05 PROCEDURE — A9579: CPT

## 2025-05-05 PROCEDURE — 73562 X-RAY EXAM OF KNEE 3: CPT

## 2025-05-05 PROCEDURE — 88311 DECALCIFY TISSUE: CPT | Mod: 26

## 2025-05-05 PROCEDURE — 73552 X-RAY EXAM OF FEMUR 2/>: CPT

## 2025-05-05 PROCEDURE — 80053 COMPREHEN METABOLIC PANEL: CPT

## 2025-05-05 PROCEDURE — 96374 THER/PROPH/DIAG INJ IV PUSH: CPT

## 2025-05-05 PROCEDURE — 86900 BLOOD TYPING SEROLOGIC ABO: CPT

## 2025-05-05 PROCEDURE — 96375 TX/PRO/DX INJ NEW DRUG ADDON: CPT

## 2025-05-05 PROCEDURE — 73700 CT LOWER EXTREMITY W/O DYE: CPT | Mod: MC

## 2025-05-05 PROCEDURE — 84165 PROTEIN E-PHORESIS SERUM: CPT

## 2025-05-05 DEVICE — IMPLANTABLE DEVICE: Type: IMPLANTABLE DEVICE | Site: LEFT | Status: FUNCTIONAL

## 2025-05-05 DEVICE — SCREW LOKG VA S-T 5X36MM: Type: IMPLANTABLE DEVICE | Site: LEFT | Status: FUNCTIONAL

## 2025-05-05 DEVICE — SCREW VA-LCP LOKG 5X70MM: Type: IMPLANTABLE DEVICE | Site: LEFT | Status: FUNCTIONAL

## 2025-05-05 DEVICE — SCREW CORT S-T 4.5X32MM: Type: IMPLANTABLE DEVICE | Site: LEFT | Status: FUNCTIONAL

## 2025-05-05 DEVICE — SCREW CORT S-T 3.5X34MM: Type: IMPLANTABLE DEVICE | Site: LEFT | Status: FUNCTIONAL

## 2025-05-05 RX ORDER — SODIUM CHLORIDE 9 G/1000ML
1000 INJECTION, SOLUTION INTRAVENOUS
Refills: 0 | Status: DISCONTINUED | OUTPATIENT
Start: 2025-05-05 | End: 2025-05-05

## 2025-05-05 RX ORDER — BISACODYL 5 MG
10 TABLET, DELAYED RELEASE (ENTERIC COATED) ORAL DAILY
Refills: 0 | Status: DISCONTINUED | OUTPATIENT
Start: 2025-05-07 | End: 2025-05-08

## 2025-05-05 RX ORDER — FENTANYL CITRATE-0.9 % NACL/PF 100MCG/2ML
25 SYRINGE (ML) INTRAVENOUS
Refills: 0 | Status: DISCONTINUED | OUTPATIENT
Start: 2025-05-05 | End: 2025-05-05

## 2025-05-05 RX ORDER — APIXABAN 2.5 MG/1
2.5 TABLET, FILM COATED ORAL
Refills: 0 | Status: DISCONTINUED | OUTPATIENT
Start: 2025-05-06 | End: 2025-05-08

## 2025-05-05 RX ORDER — OXYCODONE HYDROCHLORIDE 30 MG/1
5 TABLET ORAL EVERY 4 HOURS
Refills: 0 | Status: DISCONTINUED | OUTPATIENT
Start: 2025-05-05 | End: 2025-05-08

## 2025-05-05 RX ORDER — CEFAZOLIN SODIUM IN 0.9 % NACL 3 G/100 ML
2000 INTRAVENOUS SOLUTION, PIGGYBACK (ML) INTRAVENOUS
Refills: 0 | Status: COMPLETED | OUTPATIENT
Start: 2025-05-05 | End: 2025-05-06

## 2025-05-05 RX ORDER — MAGNESIUM SULFATE 500 MG/ML
2 SYRINGE (ML) INJECTION ONCE
Refills: 0 | Status: COMPLETED | OUTPATIENT
Start: 2025-05-05 | End: 2025-05-05

## 2025-05-05 RX ORDER — CALCIUM GLUCONATE 20 MG/ML
2 INJECTION, SOLUTION INTRAVENOUS ONCE
Refills: 0 | Status: COMPLETED | OUTPATIENT
Start: 2025-05-05 | End: 2025-05-05

## 2025-05-05 RX ORDER — TRAMADOL HYDROCHLORIDE 50 MG/1
50 TABLET, FILM COATED ORAL EVERY 4 HOURS
Refills: 0 | Status: DISCONTINUED | OUTPATIENT
Start: 2025-05-05 | End: 2025-05-08

## 2025-05-05 RX ORDER — ACETAMINOPHEN 500 MG/5ML
650 LIQUID (ML) ORAL EVERY 8 HOURS
Refills: 0 | Status: DISCONTINUED | OUTPATIENT
Start: 2025-05-05 | End: 2025-05-08

## 2025-05-05 RX ORDER — VANCOMYCIN HCL IN 5 % DEXTROSE 1.5G/250ML
1000 PLASTIC BAG, INJECTION (ML) INTRAVENOUS ONCE
Refills: 0 | Status: COMPLETED | OUTPATIENT
Start: 2025-05-05 | End: 2025-05-05

## 2025-05-05 RX ORDER — KETOROLAC TROMETHAMINE 30 MG/ML
15 INJECTION, SOLUTION INTRAMUSCULAR; INTRAVENOUS ONCE
Refills: 0 | Status: DISCONTINUED | OUTPATIENT
Start: 2025-05-05 | End: 2025-05-05

## 2025-05-05 RX ORDER — ONDANSETRON HCL/PF 4 MG/2 ML
4 VIAL (ML) INJECTION ONCE
Refills: 0 | Status: DISCONTINUED | OUTPATIENT
Start: 2025-05-05 | End: 2025-05-05

## 2025-05-05 RX ORDER — METOPROLOL SUCCINATE 50 MG/1
2.5 TABLET, EXTENDED RELEASE ORAL ONCE
Refills: 0 | Status: COMPLETED | OUTPATIENT
Start: 2025-05-05 | End: 2025-05-05

## 2025-05-05 RX ORDER — TRAMADOL HYDROCHLORIDE 50 MG/1
25 TABLET, FILM COATED ORAL EVERY 4 HOURS
Refills: 0 | Status: DISCONTINUED | OUTPATIENT
Start: 2025-05-05 | End: 2025-05-08

## 2025-05-05 RX ORDER — SODIUM CHLORIDE 9 G/1000ML
1000 INJECTION, SOLUTION INTRAVENOUS
Refills: 0 | Status: DISCONTINUED | OUTPATIENT
Start: 2025-05-05 | End: 2025-05-08

## 2025-05-05 RX ORDER — ALBUMIN (HUMAN) 12.5 G/50ML
250 INJECTION, SOLUTION INTRAVENOUS
Refills: 0 | Status: COMPLETED | OUTPATIENT
Start: 2025-05-05 | End: 2025-05-05

## 2025-05-05 RX ORDER — METOCLOPRAMIDE HCL 10 MG
10 TABLET ORAL ONCE
Refills: 0 | Status: DISCONTINUED | OUTPATIENT
Start: 2025-05-05 | End: 2025-05-05

## 2025-05-05 RX ORDER — SODIUM PHOSPHATE,DIBASIC DIHYD
15 POWDER (GRAM) MISCELLANEOUS ONCE
Refills: 0 | Status: COMPLETED | OUTPATIENT
Start: 2025-05-05 | End: 2025-05-07

## 2025-05-05 RX ORDER — MAGNESIUM HYDROXIDE 400 MG/5ML
30 SUSPENSION ORAL DAILY
Refills: 0 | Status: DISCONTINUED | OUTPATIENT
Start: 2025-05-05 | End: 2025-05-08

## 2025-05-05 RX ORDER — METOCLOPRAMIDE HCL 10 MG
5 TABLET ORAL ONCE
Refills: 0 | Status: COMPLETED | OUTPATIENT
Start: 2025-05-05 | End: 2025-05-05

## 2025-05-05 RX ORDER — ONDANSETRON HCL/PF 4 MG/2 ML
4 VIAL (ML) INJECTION EVERY 6 HOURS
Refills: 0 | Status: DISCONTINUED | OUTPATIENT
Start: 2025-05-05 | End: 2025-05-08

## 2025-05-05 RX ADMIN — Medication 2000 MILLIGRAM(S): at 21:49

## 2025-05-05 RX ADMIN — METOPROLOL SUCCINATE 2.5 MILLIGRAM(S): 50 TABLET, EXTENDED RELEASE ORAL at 17:00

## 2025-05-05 RX ADMIN — HEPARIN SODIUM 2000 UNIT(S): 1000 INJECTION INTRAVENOUS; SUBCUTANEOUS at 00:00

## 2025-05-05 RX ADMIN — TIMOLOL MALEATE 1 DROP(S): 6.8 SOLUTION OPHTHALMIC at 21:48

## 2025-05-05 RX ADMIN — Medication 5 MILLIGRAM(S): at 15:08

## 2025-05-05 RX ADMIN — BRIMONIDINE TARTRATE 1 DROP(S): 1.5 SOLUTION/ DROPS OPHTHALMIC at 05:18

## 2025-05-05 RX ADMIN — Medication 250 MILLIGRAM(S): at 10:14

## 2025-05-05 RX ADMIN — MUPIROCIN CALCIUM 1 APPLICATION(S): 20 CREAM TOPICAL at 05:18

## 2025-05-05 RX ADMIN — METOPROLOL SUCCINATE 2.5 MILLIGRAM(S): 50 TABLET, EXTENDED RELEASE ORAL at 17:19

## 2025-05-05 RX ADMIN — Medication 25 GRAM(S): at 17:19

## 2025-05-05 RX ADMIN — CALCIUM GLUCONATE 100 GRAM(S): 20 INJECTION, SOLUTION INTRAVENOUS at 16:43

## 2025-05-05 RX ADMIN — TIMOLOL MALEATE 1 DROP(S): 6.8 SOLUTION OPHTHALMIC at 05:18

## 2025-05-05 RX ADMIN — ALBUMIN (HUMAN) 500 MILLILITER(S): 12.5 INJECTION, SOLUTION INTRAVENOUS at 15:19

## 2025-05-05 RX ADMIN — LISINOPRIL 10 MILLIGRAM(S): 5 TABLET ORAL at 05:18

## 2025-05-05 RX ADMIN — Medication 4 MILLIGRAM(S): at 14:30

## 2025-05-05 RX ADMIN — ALBUMIN (HUMAN) 500 MILLILITER(S): 12.5 INJECTION, SOLUTION INTRAVENOUS at 15:05

## 2025-05-05 RX ADMIN — Medication 650 MILLIGRAM(S): at 22:00

## 2025-05-05 RX ADMIN — LISINOPRIL 25 MILLIGRAM(S): 30 TABLET ORAL at 05:19

## 2025-05-05 RX ADMIN — Medication 1 APPLICATION(S): at 05:18

## 2025-05-05 RX ADMIN — Medication 650 MILLIGRAM(S): at 21:51

## 2025-05-05 RX ADMIN — BRIMONIDINE TARTRATE 1 DROP(S): 1.5 SOLUTION/ DROPS OPHTHALMIC at 21:48

## 2025-05-05 RX ADMIN — SODIUM CHLORIDE 75 MILLILITER(S): 9 INJECTION, SOLUTION INTRAVENOUS at 13:55

## 2025-05-05 RX ADMIN — Medication 1 APPLICATION(S): at 09:00

## 2025-05-05 NOTE — DISCHARGE NOTE PROVIDER - NSDCCPTREATMENT_GEN_ALL_CORE_FT
PRINCIPAL PROCEDURE  Procedure: Retrograde intramedullary nailing of fracture of femoral shaft  Findings and Treatment:       SECONDARY PROCEDURE  Procedure: ORIF fracture of left distal femur  Findings and Treatment:

## 2025-05-05 NOTE — BRIEF OPERATIVE NOTE - COMMENTS
Post-op Plan: WBAT, PT/OT, ancef per protocol, f/u pathology, contralateral SCD, LMWH (or equivalent) x 4 weeks post-op, likely f/u prn or telehealth due to age, medical comorbidities and limited functional status.

## 2025-05-05 NOTE — DISCHARGE NOTE PROVIDER - NSDCFUSCHEDAPPT_GEN_ALL_CORE_FT
Efren Marcos  [facility]  OPHTHALM [address] [facility]  Scheduled Appointment: 05/13/2025

## 2025-05-05 NOTE — BRIEF OPERATIVE NOTE - NSICDXBRIEFPROCEDURE_GEN_ALL_CORE_FT
PROCEDURES:  Retrograde intramedullary nailing of fracture of femoral shaft 05-May-2025 13:27:51  Shay Dolan  ORIF fracture of left distal femur 05-May-2025 13:28:15  Shay Dolan

## 2025-05-05 NOTE — DISCHARGE NOTE PROVIDER - NSDCMRMEDTOKEN_GEN_ALL_CORE_FT
brimonidine 0.2% ophthalmic solution: 1 drop(s) in the right eye 2 times a day  Eliquis 2.5 mg oral tablet: 1 tab(s) orally 2 times a day  nebivolol 2.5 mg oral tablet: 1 tab(s) orally once a day  Nebivolol 2.5 mg oral tablet: 1 tab(s) orally once a day  ramipril 2.5 mg oral tablet: 1 tab(s) orally once a day (at bedtime)  timolol maleate 0.5% ophthalmic solution: 1 drop(s) in the right eye 2 times a day  vitamin A: 2,000 international unit(s) orally once a day   bisacodyl 10 mg rectal suppository: 1 suppository(ies) rectal once a day As needed If no bowel movement  brimonidine 0.2% ophthalmic solution: 1 drop(s) in the right eye 2 times a day  Eliquis 2.5 mg oral tablet: 1 tab(s) orally 2 times a day  nebivolol 2.5 mg oral tablet: 1 tab(s) orally once a day  polyethylene glycol 3350 oral powder for reconstitution: 17 gram(s) orally once a day  ramipril 2.5 mg oral tablet: 1 tab(s) orally once a day (at bedtime)  senna leaf extract oral tablet: 2 tab(s) orally once a day (at bedtime)  tamsulosin 0.4 mg oral capsule: 1 cap(s) orally once a day (at bedtime)  timolol maleate 0.5% ophthalmic solution: 1 drop(s) in the right eye 2 times a day  traMADol 50 mg oral tablet: 0.5 tab(s) orally every 4 hours as needed for Mild Pain (1 - 3)  traMADol 50 mg oral tablet: 1 tab(s) orally every 4 hours as needed for Moderate Pain (4 - 6)  vitamin A: 2,000 international unit(s) orally once a day

## 2025-05-05 NOTE — DISCHARGE NOTE PROVIDER - DETAILS OF MALNUTRITION DIAGNOSIS/DIAGNOSES
This patient has been assessed with a concern for Malnutrition and was treated during this hospitalization for the following Nutrition diagnosis/diagnoses:     -  05/06/2025: Moderate protein-calorie malnutrition   -  05/06/2025: Underweight (BMI < 19)

## 2025-05-05 NOTE — DISCHARGE NOTE PROVIDER - NSDCCPCAREPLAN_GEN_ALL_CORE_FT
PRINCIPAL DISCHARGE DIAGNOSIS  Diagnosis: Fracture, proximal femur  Assessment and Plan of Treatment: s/p IMN. Follow up with Orthopedics.      SECONDARY DISCHARGE DIAGNOSES  Diagnosis: Axillary mass, right  Assessment and Plan of Treatment: Follow up with Oncology for biopsy if desired.

## 2025-05-05 NOTE — DISCHARGE NOTE PROVIDER - PROVIDER TOKENS
PROVIDER:[TOKEN:[[id number]]] PROVIDER:[TOKEN:[[id number]]],PROVIDER:[TOKEN:[[id number]]]

## 2025-05-05 NOTE — DISCHARGE NOTE PROVIDER - CARE PROVIDER_API CALL
Shay Dolan  Orthopaedic Trauma  [address] [place] [address]  Phone: [phone]  Fax: [phone]  Follow Up Time:    Shay Dolan  Orthopaedic Trauma  [address] [place] [address]  Phone: [phone]  Fax: [phone]  Follow Up Time:     Klaudia Cowan-Victorina  Medical Oncology  [address] [facility], [address]  [place] [address]  Phone: [phone]  Fax: [phone]  Follow Up Time:

## 2025-05-05 NOTE — DISCHARGE NOTE PROVIDER - NSDCFUADDINST_GEN_ALL_CORE_FT
Ortho: The patient will be seen in the office between 1-2 weeks for wound check. Patient may shower after post-op day #3. The dressing is to be removed on POD#7. IF THE DRESSING BECOMES SOILED BEFORE THE REMOVAL DATE, CHANGE WITH A SIMILAR DRESSING. IF THE DRESSING BECOMES STAINED WITH DISCHARGE, CONTACT THE OFFICE FOR FURTHER DIRECTIONS.  The patient will contact the office if the wound becomes red, has increasing pain, develops bleeding or discharge, an injury occurs, or has other concerns. The patient will continue PT for gait training. The patient will continue home dose of ELIQUIS for blood clot prevention. The patient will take pain medication as prescribed for pain control and titrate according to prescription and patient needs. The patient is FULL weight bearing.

## 2025-05-05 NOTE — DISCHARGE NOTE PROVIDER - CARE PROVIDERS DIRECT ADDRESSES
[email or web address]

## 2025-05-05 NOTE — BRIEF OPERATIVE NOTE - OPERATION/FINDINGS
implants: Synthes RFN-a 10x200 mm with 3 distal interlocks, 16 hole VA-LCP condylar plate with 3 distal 5.0 locking screws, 2 4.5 mm cortical screws midshaft (1 through plate & nail), 1 proximal 5.0 mm locking screw

## 2025-05-05 NOTE — DISCHARGE NOTE PROVIDER - HOSPITAL COURSE
Patient is an 89 year old female with a history of blindness, glaucoma, hypertension, atrial flutter, and a history of PE on Eliquis. She was admitted for a left femur fracture after a mechanical fall at home. Imaging revealed an acute comminuted displaced supracondylar fracture of the left femur with possible metastatic involvement, confirmed by MRI. Additionally, a CT scan identified a 7.5 cm indeterminate mass in the right axilla, suspected to be neoplastic, and a 15 mm right renal lesion possibly representing a hemorrhagic cyst. A consult with orthopedics recommended transfer to [facility] for surgical intervention, which the patient underwent with ORIF. Pain was managed with Tylenol and Dilaudid, along with a bowel regimen and Zofran PRN for nausea. Physical therapy encouraged weight-bearing as tolerated.    Concerning possible metastatic disease, the patient expressed wishes against chemotherapy but may be open to biopsy following discussions with family. Referral for outpatient follow-up for further imaging and biopsy is planned. The renal lesion will be followed up outpatient, questioning it as a hemorrhagic cyst.    For atrial flutter, Eliquis will be continued, transitioning from Nebivolol to Atenolol with hold parameters, while maintaining telemetry monitoring.    Hypertension is well controlled, transitioning from Ramipril to Lisinopril with hold parameters.    Regarding glaucoma, with blindness and history of retinal detachment, ophthalmology is involved for outpatient follow-up, and home eye drops are continued.    Transaminitis was noted to be mild and improved, with plans to monitor LFTs outpatient.    Patient discharged to [facility].

## 2025-05-06 LAB
ANION GAP SERPL CALC-SCNC: 10 MMOL/L — SIGNIFICANT CHANGE UP (ref 5–17)
ANISOCYTOSIS BLD QL: SLIGHT — SIGNIFICANT CHANGE UP
BASOPHILS # BLD AUTO: 0.01 K/UL — SIGNIFICANT CHANGE UP (ref 0–0.2)
BASOPHILS # BLD MANUAL: 0.07 K/UL — SIGNIFICANT CHANGE UP (ref 0–0.2)
BASOPHILS NFR BLD AUTO: 0.1 % — SIGNIFICANT CHANGE UP (ref 0–2)
BASOPHILS NFR BLD MANUAL: 0.8 % — SIGNIFICANT CHANGE UP (ref 0–2)
BUN SERPL-MCNC: 15.5 MG/DL — SIGNIFICANT CHANGE UP (ref 8–20)
BURR CELLS BLD QL SMEAR: ABNORMAL
CALCIUM SERPL-MCNC: 8 MG/DL — LOW (ref 8.4–10.5)
CHLORIDE SERPL-SCNC: 109 MMOL/L — HIGH (ref 96–108)
CO2 SERPL-SCNC: 22 MMOL/L — SIGNIFICANT CHANGE UP (ref 22–29)
CREAT SERPL-MCNC: 0.6 MG/DL — SIGNIFICANT CHANGE UP (ref 0.5–1.3)
EGFR: 86 ML/MIN/1.73M2 — SIGNIFICANT CHANGE UP
EGFR: 86 ML/MIN/1.73M2 — SIGNIFICANT CHANGE UP
ELLIPTOCYTES BLD QL SMEAR: SLIGHT — SIGNIFICANT CHANGE UP
EOSINOPHIL # BLD AUTO: 0.03 K/UL — SIGNIFICANT CHANGE UP (ref 0–0.5)
EOSINOPHIL # BLD MANUAL: 0 K/UL — SIGNIFICANT CHANGE UP (ref 0–0.5)
EOSINOPHIL NFR BLD AUTO: 0.3 % — SIGNIFICANT CHANGE UP (ref 0–6)
EOSINOPHIL NFR BLD MANUAL: 0 % — SIGNIFICANT CHANGE UP (ref 0–6)
GLUCOSE SERPL-MCNC: 102 MG/DL — HIGH (ref 70–99)
HCT VFR BLD CALC: 29.6 % — LOW (ref 34.5–45)
HGB BLD-MCNC: 10.1 G/DL — LOW (ref 11.5–15.5)
IMM GRANULOCYTES # BLD AUTO: 0.07 K/UL — SIGNIFICANT CHANGE UP (ref 0–0.07)
IMM GRANULOCYTES NFR BLD AUTO: 0.8 % — SIGNIFICANT CHANGE UP (ref 0–0.9)
INR BLD: 1.02 RATIO — SIGNIFICANT CHANGE UP (ref 0.85–1.16)
LYMPHOCYTES # BLD AUTO: 1.23 K/UL — SIGNIFICANT CHANGE UP (ref 1–3.3)
LYMPHOCYTES # BLD MANUAL: 0.46 K/UL — LOW (ref 1–3.3)
LYMPHOCYTES NFR BLD AUTO: 13.5 % — SIGNIFICANT CHANGE UP (ref 13–44)
LYMPHOCYTES NFR BLD MANUAL: 5.1 % — LOW (ref 13–44)
MACROCYTES BLD QL: SLIGHT — SIGNIFICANT CHANGE UP
MANUAL METAMYELOCYTE #: 0.07 K/UL — HIGH (ref 0–0)
MANUAL REACTIVE LYMPHOCYTES #: 0.07 K/UL — SIGNIFICANT CHANGE UP (ref 0–0.63)
MCHC RBC-ENTMCNC: 30.2 PG — SIGNIFICANT CHANGE UP (ref 27–34)
MCHC RBC-ENTMCNC: 34.1 G/DL — SIGNIFICANT CHANGE UP (ref 32–36)
MCV RBC AUTO: 88.6 FL — SIGNIFICANT CHANGE UP (ref 80–100)
METAMYELOCYTES # FLD: 0.8 % — HIGH (ref 0–0)
METAMYELOCYTES NFR BLD: 0.8 % — HIGH (ref 0–0)
MICROCYTES BLD QL: SLIGHT — SIGNIFICANT CHANGE UP
MONOCYTES # BLD AUTO: 1.02 K/UL — HIGH (ref 0–0.9)
MONOCYTES # BLD MANUAL: 0.46 K/UL — SIGNIFICANT CHANGE UP (ref 0–0.9)
MONOCYTES NFR BLD AUTO: 11.2 % — SIGNIFICANT CHANGE UP (ref 2–14)
MONOCYTES NFR BLD MANUAL: 5.1 % — SIGNIFICANT CHANGE UP (ref 2–14)
NEUTROPHILS # BLD AUTO: 6.75 K/UL — SIGNIFICANT CHANGE UP (ref 1.8–7.4)
NEUTROPHILS # BLD MANUAL: 7.96 K/UL — HIGH (ref 1.8–7.4)
NEUTROPHILS NFR BLD AUTO: 74.1 % — SIGNIFICANT CHANGE UP (ref 43–77)
NEUTROPHILS NFR BLD MANUAL: 87.4 % — HIGH (ref 43–77)
NRBC # BLD AUTO: 0 K/UL — SIGNIFICANT CHANGE UP (ref 0–0)
NRBC # FLD: 0 K/UL — SIGNIFICANT CHANGE UP (ref 0–0)
NRBC BLD AUTO-RTO: 0 /100 WBCS — SIGNIFICANT CHANGE UP (ref 0–0)
OVALOCYTES BLD QL SMEAR: SLIGHT — SIGNIFICANT CHANGE UP
PLAT MORPH BLD: NORMAL — SIGNIFICANT CHANGE UP
PLATELET # BLD AUTO: 125 K/UL — LOW (ref 150–400)
PMV BLD: 10.8 FL — SIGNIFICANT CHANGE UP (ref 7–13)
POIKILOCYTOSIS BLD QL AUTO: ABNORMAL
POLYCHROMASIA BLD QL SMEAR: ABNORMAL
POTASSIUM SERPL-MCNC: 4.6 MMOL/L — SIGNIFICANT CHANGE UP (ref 3.5–5.3)
POTASSIUM SERPL-SCNC: 4.6 MMOL/L — SIGNIFICANT CHANGE UP (ref 3.5–5.3)
PROTHROM AB SERPL-ACNC: 11.5 SEC — SIGNIFICANT CHANGE UP (ref 9.9–13.4)
RBC # BLD: 3.34 M/UL — LOW (ref 3.8–5.2)
RBC # FLD: 17.2 % — HIGH (ref 10.3–14.5)
RBC BLD AUTO: ABNORMAL
SODIUM SERPL-SCNC: 141 MMOL/L — SIGNIFICANT CHANGE UP (ref 135–145)
VARIANT LYMPHS # BLD: 0.8 % — SIGNIFICANT CHANGE UP (ref 0–6)
VARIANT LYMPHS NFR BLD MANUAL: 0.8 % — SIGNIFICANT CHANGE UP (ref 0–6)
WBC # BLD: 9.11 K/UL — SIGNIFICANT CHANGE UP (ref 3.8–10.5)
WBC # FLD AUTO: 9.11 K/UL — SIGNIFICANT CHANGE UP (ref 3.8–10.5)

## 2025-05-06 PROCEDURE — 99233 SBSQ HOSP IP/OBS HIGH 50: CPT

## 2025-05-06 PROCEDURE — 99233 SBSQ HOSP IP/OBS HIGH 50: CPT | Mod: FS

## 2025-05-06 RX ORDER — TAMSULOSIN HYDROCHLORIDE 0.4 MG/1
0.4 CAPSULE ORAL AT BEDTIME
Refills: 0 | Status: DISCONTINUED | OUTPATIENT
Start: 2025-05-06 | End: 2025-05-08

## 2025-05-06 RX ADMIN — TRAMADOL HYDROCHLORIDE 50 MILLIGRAM(S): 50 TABLET, FILM COATED ORAL at 09:02

## 2025-05-06 RX ADMIN — Medication 650 MILLIGRAM(S): at 14:52

## 2025-05-06 RX ADMIN — SODIUM CHLORIDE 75 MILLILITER(S): 9 INJECTION, SOLUTION INTRAVENOUS at 22:38

## 2025-05-06 RX ADMIN — APIXABAN 2.5 MILLIGRAM(S): 2.5 TABLET, FILM COATED ORAL at 05:52

## 2025-05-06 RX ADMIN — TRAMADOL HYDROCHLORIDE 50 MILLIGRAM(S): 50 TABLET, FILM COATED ORAL at 09:50

## 2025-05-06 RX ADMIN — BRIMONIDINE TARTRATE 1 DROP(S): 1.5 SOLUTION/ DROPS OPHTHALMIC at 05:53

## 2025-05-06 RX ADMIN — Medication 650 MILLIGRAM(S): at 05:52

## 2025-05-06 RX ADMIN — TIMOLOL MALEATE 1 DROP(S): 6.8 SOLUTION OPHTHALMIC at 05:53

## 2025-05-06 RX ADMIN — APIXABAN 2.5 MILLIGRAM(S): 2.5 TABLET, FILM COATED ORAL at 18:15

## 2025-05-06 RX ADMIN — TRAMADOL HYDROCHLORIDE 50 MILLIGRAM(S): 50 TABLET, FILM COATED ORAL at 22:39

## 2025-05-06 RX ADMIN — Medication 650 MILLIGRAM(S): at 23:10

## 2025-05-06 RX ADMIN — LISINOPRIL 10 MILLIGRAM(S): 5 TABLET ORAL at 05:52

## 2025-05-06 RX ADMIN — TAMSULOSIN HYDROCHLORIDE 0.4 MILLIGRAM(S): 0.4 CAPSULE ORAL at 22:39

## 2025-05-06 RX ADMIN — BRIMONIDINE TARTRATE 1 DROP(S): 1.5 SOLUTION/ DROPS OPHTHALMIC at 18:16

## 2025-05-06 RX ADMIN — TIMOLOL MALEATE 1 DROP(S): 6.8 SOLUTION OPHTHALMIC at 18:20

## 2025-05-06 RX ADMIN — LISINOPRIL 25 MILLIGRAM(S): 30 TABLET ORAL at 05:52

## 2025-05-06 RX ADMIN — TRAMADOL HYDROCHLORIDE 50 MILLIGRAM(S): 50 TABLET, FILM COATED ORAL at 23:15

## 2025-05-06 RX ADMIN — Medication 2000 MILLIGRAM(S): at 05:53

## 2025-05-06 RX ADMIN — Medication 650 MILLIGRAM(S): at 06:43

## 2025-05-06 RX ADMIN — MUPIROCIN CALCIUM 1 APPLICATION(S): 20 CREAM TOPICAL at 18:16

## 2025-05-06 RX ADMIN — MUPIROCIN CALCIUM 1 APPLICATION(S): 20 CREAM TOPICAL at 05:54

## 2025-05-06 RX ADMIN — Medication 650 MILLIGRAM(S): at 15:25

## 2025-05-06 RX ADMIN — Medication 2 TABLET(S): at 22:39

## 2025-05-06 RX ADMIN — Medication 3 MILLIGRAM(S): at 22:39

## 2025-05-06 RX ADMIN — Medication 650 MILLIGRAM(S): at 22:38

## 2025-05-06 RX ADMIN — Medication 4 MILLIGRAM(S): at 09:06

## 2025-05-06 NOTE — PHYSICAL THERAPY INITIAL EVALUATION ADULT - ASSISTIVE DEVICE, REHAB EVAL
Pt sts this morning was working at a standing desk and noted visual disturbance to right eye that lasted 15-20\", feeling fullness to ears. . Noted BP of 140/108, pulse 90's. Denies SOB or chest pain. Denies vision changes now.
bed rails

## 2025-05-06 NOTE — PHYSICAL THERAPY INITIAL EVALUATION ADULT - IMPAIRMENTS CONTRIBUTING TO GAIT DEVIATIONS, PT EVAL
increased fear, delayed processing of cues, able to manage a few steps with max encouragement/impaired coordination/impaired motor control/pain/impaired postural control/decreased strength

## 2025-05-06 NOTE — DIETITIAN INITIAL EVALUATION ADULT - PERTINENT MEDS FT
MEDICATIONS  (STANDING):  lactated ringers. 1000 milliLiter(s) (75 mL/Hr) IV Continuous <Continuous>  polyethylene glycol 3350 17 Gram(s) Oral daily  senna 2 Tablet(s) Oral at bedtime  sodium phosphate 15 milliMole(s)/250 mL IVPB 15 milliMole(s) IV Intermittent once      MEDICATIONS  (PRN):  aluminum hydroxide/magnesium hydroxide/simethicone Suspension 30 milliLiter(s) Oral every 4 hours PRN Dyspepsia  HYDROmorphone  Injectable 0.5 milliGRAM(s) IV Push every 6 hours PRN Severe Pain (7 - 10)  magnesium hydroxide Suspension 30 milliLiter(s) Oral daily PRN Constipation  ondansetron Injectable 4 milliGRAM(s) IV Push every 8 hours PRN Nausea and/or Vomiting  ondansetron Injectable 4 milliGRAM(s) IV Push every 6 hours PRN Nausea and/or Vomiting  oxyCODONE    IR 5 milliGRAM(s) Oral every 4 hours PRN Severe Pain (7 - 10)

## 2025-05-06 NOTE — PHYSICAL THERAPY INITIAL EVALUATION ADULT - PERTINENT HX OF CURRENT PROBLEM, REHAB EVAL
reports to [facility] ED s/p mechanical fall from standing with possible pathological fx in nature

## 2025-05-06 NOTE — CONSULT NOTE ADULT - SUBJECTIVE AND OBJECTIVE BOX
Hematology Consult Note    HPI:  89yoF w/ PMHx blindness, glaucoma, retinal detachment, HTN, atrial flutter and hx PE on eliquis who presents to the ED with L leg pain s/p mechanical fall at home presented to [facility] ED w/ L leg pain s/p mechanical fall at home. X-ray, CT, and MRI done. Concern for pathologic fx, concern for metastatic disease. CT A/P showing axillary mass. Heme/onc and ortho consulted at [facility]. Patient transferred to [facility]. Dr. Dolan accepted patient for surgery.    Allergies    No Known Allergies    Intolerances    erythromycin (Stomach Upset)  codeine (Headache)      MEDICATIONS  (STANDING):  acetaminophen     Tablet .. 650 milliGRAM(s) Oral every 8 hours  apixaban 2.5 milliGRAM(s) Oral two times a day  atenolol  Tablet 25 milliGRAM(s) Oral daily  brimonidine 0.2% Ophthalmic Solution 1 Drop(s) Right EYE two times a day  ceFAZolin  Injectable. 2000 milliGRAM(s) IV Push once  lactated ringers. 1000 milliLiter(s) (75 mL/Hr) IV Continuous <Continuous>  lisinopril 10 milliGRAM(s) Oral daily  mupirocin 2% Ointment 1 Application(s) Both Nostrils two times a day  polyethylene glycol 3350 17 Gram(s) Oral daily  senna 2 Tablet(s) Oral at bedtime  sodium phosphate 15 milliMole(s)/250 mL IVPB 15 milliMole(s) IV Intermittent once  timolol 0.5% Solution 1 Drop(s) Right EYE two times a day    MEDICATIONS  (PRN):  aluminum hydroxide/magnesium hydroxide/simethicone Suspension 30 milliLiter(s) Oral every 4 hours PRN Dyspepsia  HYDROmorphone  Injectable 0.5 milliGRAM(s) IV Push every 6 hours PRN Severe Pain (7 - 10)  magnesium hydroxide Suspension 30 milliLiter(s) Oral daily PRN Constipation  melatonin 3 milliGRAM(s) Oral at bedtime PRN Insomnia  ondansetron Injectable 4 milliGRAM(s) IV Push every 8 hours PRN Nausea and/or Vomiting  ondansetron Injectable 4 milliGRAM(s) IV Push every 6 hours PRN Nausea and/or Vomiting  oxyCODONE    IR 5 milliGRAM(s) Oral every 4 hours PRN Severe Pain (7 - 10)  traMADol 25 milliGRAM(s) Oral every 4 hours PRN Mild Pain (1 - 3)  traMADol 50 milliGRAM(s) Oral every 4 hours PRN Moderate Pain (4 - 6)      PAST MEDICAL & SURGICAL HISTORY:  High cholesterol      HTN (hypertension)      History of fracture of femur          FAMILY HISTORY:  FHx: colon cancer (Father)        SOCIAL HISTORY: No EtOH, no tobacco    REVIEW OF SYSTEMS:    CONSTITUTIONAL: No weakness, fevers or chills  EYES/ENT: No visual changes;  No vertigo or throat pain   NECK: No pain or stiffness  RESPIRATORY: No cough, wheezing, hemoptysis; No shortness of breath  CARDIOVASCULAR: No chest pain or palpitations  GASTROINTESTINAL: No abdominal or epigastric pain. No nausea, vomiting, or hematemesis; No diarrhea or constipation. No melena or hematochezia.  GENITOURINARY: No dysuria, frequency or hematuria  NEUROLOGICAL: No numbness or weakness  SKIN: No itching, burning, rashes, or lesions   All other review of systems is negative unless indicated above.        T(F): 97.9 (05-06-25 @ 08:57), Max: 98.6 (05-05-25 @ 19:05)  HR: 71 (05-06-25 @ 08:57)  BP: 145/80 (05-06-25 @ 08:57)  RR: 18 (05-06-25 @ 08:57)  SpO2: 93% (05-06-25 @ 08:57)  Wt(kg): --    GENERAL: NAD, well-developed  HEAD:  Atraumatic, Normocephalic  EYES: EOMI, PERRLA, conjunctiva and sclera clear  NECK: Supple, No JVD  CHEST/LUNG: Clear to auscultation bilaterally; No wheeze  HEART: Regular rate and rhythm; No murmurs, rubs, or gallops  ABDOMEN: Soft, Nontender, Nondistended; Bowel sounds present  EXTREMITIES:  2+ Peripheral Pulses, No clubbing, cyanosis, or edema  NEUROLOGY: non-focal  SKIN: No rashes or lesions                          10.1   9.11  )-----------( 125      ( 06 May 2025 04:22 )             29.6       05-06    141  |  109[H]  |  15.5  ----------------------------<  102[H]  4.6   |  22.0  |  0.60    Ca    8.0[L]      06 May 2025 04:22  Phos  2.1     05-05  Mg     1.5     05-05    TPro  5.4[L]  /  Alb  2.9[L]  /  TBili  0.8  /  DBili  x   /  AST  32[H]  /  ALT  34[H]  /  AlkPhos  76  05-05      Magnesium: 1.5 mg/dL (05-05 @ 13:15)  Phosphorus: 2.1 mg/dL (05-05 @ 13:15)    < from: CT Abdomen and Pelvis No Cont (05.03.25 @ 08:48) >  ACC: [id number] EXAM:  CT ABDOMEN AND PELVIS   ORDERED BY: XANDER DESIR     ACC: [id number] EXAM:  CT CHEST   ORDERED BY: XANDER DESIR     PROCEDURE DATE:  05/03/2025          INTERPRETATION:  CLINICAL INFORMATION: Distal femur fracture in setting   of possible bony metastases.    COMPARISON: Left femur CT and radiographs from May 02, 2025 were reviewed.    CONTRAST/COMPLICATIONS:  IV Contrast: NONE  Oral Contrast: NONE  .    PROCEDURE:  CT of the Chest, Abdomen and Pelvis was performed.  Sagittal and coronal reformats were performed.    FINDINGS:  CHEST:  LUNGS AND LARGE AIRWAYS: Patent central airways. No pulmonary nodules.   Mild biapical scar. Peripheral reticular opacities in the anterior left   lower lobe, lingula and right middle lobe compatible with interstitial   lung disease. Platelike atelectasis at both lung bases.  PLEURA: No pleural effusion.  VESSELS: Atherosclerotic arterial calcifications, including coronary   artery calcification.  HEART: Heart size is normal. No pericardial effusion.  MEDIASTINUM AND SARMAD: No lymphadenopathy.  CHEST WALL AND LOWER NECK: 5.5 x 7.5 x 6.5 cm partially calcified soft   tissue mass in the lateral aspect of the right axilla.    ABDOMEN AND PELVIS:  LIVER: Within normal limits.  BILE DUCTS: Normal caliber.  GALLBLADDER: Within normal limits.  SPLEEN: Within normal limits.  PANCREAS: Within normal limits.  ADRENALS: Within normal limits.  KIDNEYS/URETERS: Right renal cyst. 15 mm hyperattenuating lesion at the   upper pole of the rightkidney, indeterminate.    BLADDER: Within normal limits.  REPRODUCTIVE ORGANS: 1.3 cm simple appearing left adnexal cyst. Scattered   uterine calcifications suggestive of fibroids.    BOWEL: No bowel obstruction. Appendix is normal. Colonic diverticulosis.   Small hiatal hernia.  PERITONEUM/RETROPERITONEUM: Within normal limits.  VESSELS: Atherosclerotic changes.  LYMPH NODES: No lymphadenopathy.  MUSCULOSKELETAL: Fatty atrophy of the left psoas and gluteal muscles,   suggestive of disuse atrophy. Diffuse expansion of the left thigh   musculature which could be due to edema or intramuscular blood.  No lytic or blastic bony lesion. Degenerative changes in the spine, both   shoulders and both hips. Status post left hip internal fixation. Moderate   L3 compression deformity. Old left rib fractures. Sacral Tarlov cyst.    IMPRESSION:  7.5 cm indeterminate mass in the lateral aspect of the right axilla,   poorly characterized on this unenhanced CT and suspect for neoplasm.   Recommend contrast-enhanced MRI to better evaluate.    Indeterminate 15 mm right renal lesion, possibly a hemorrhagic cyst.   Recommend further evaluation with targeted ultrasound as neoplasm is also   in the differential diagnosis.    Mild fibrotic change in the anterior aspect of the lower lungs.        --- End of Report ---        < end of copied text >  < from: MR Femur w/wo IV Cont, Left (05.03.25 @ 12:30) >  ACC: [id number] EXAM:  MR FEMUR WAW IC LT   ORDERED BY: XANDER DESIR     PROCEDURE DATE:  05/03/2025          INTERPRETATION:  Clinical Information: Distal femoral fracture    Comparison: CT scan of the left femur from 5/2/2025 and left knee   radiographs from 5/2/2025.    Technique:  MRI of the left femur.  Intravenous Contrast: 5.5 cc Gadavist contrast were administered and 2.0   cc were discarded.    Findings:    Again noted is a acute comminuted, intra-articular fracture of the left   distal femur beginning at the distal femoral metadiaphysis medially.   There is 0.7 cm of medial displacement of the major distal fracture   fragment. There is signal abnormality at this location which appears   somewhat ovoid (4, 13) and extends into the adjacent soft tissues   greatest posteriorly (10, 26) and posterior medially at the cranial   aspect which is favored to be related to an underlying lesion. The   adductor armida inserts into the region of the soft tissue mass. There   are small rounded foci of signal abnormality just above the fracture line   laterally and posterior laterally which are suspicious for additional   foci of metastatic disease. There is knee lipohemarthrosis again noted.    There is extensive intramuscular edema greatest within the vastus   intermedius and medialis muscles . Edema extends into the proximally   imaged posterior compartment of the leg. There is subcutaneous edema   greatest laterally. There is a small left hip joint effusion partially   imaged. There is a partially imaged left greater trochanteric bursitis.    Impression:  Acute, comminuted, intra-articular distal femoral fracture which appears   pathologic in etiology with small additional foci of metastatic disease.    --- End of Report ---           Hematology Consult Note    HPI:  89yoF w/ PMHx blindness, glaucoma, retinal detachment, HTN, atrial flutter and hx PE on eliquis who presents to the ED with L leg pain s/p mechanical fall at home presented to [facility] ED w/ L leg pain s/p mechanical fall at home. X-ray, CT, and MRI done. Concern for pathologic fx, concern for metastatic disease. CT A/P showing axillary mass. Heme/onc and ortho consulted at [facility]. Patient transferred to [facility]. Dr. Dolan accepted patient for surgery.    Allergies    No Known Allergies    Intolerances    erythromycin (Stomach Upset)  codeine (Headache)      MEDICATIONS  (STANDING):  acetaminophen     Tablet .. 650 milliGRAM(s) Oral every 8 hours  apixaban 2.5 milliGRAM(s) Oral two times a day  atenolol  Tablet 25 milliGRAM(s) Oral daily  brimonidine 0.2% Ophthalmic Solution 1 Drop(s) Right EYE two times a day  ceFAZolin  Injectable. 2000 milliGRAM(s) IV Push once  lactated ringers. 1000 milliLiter(s) (75 mL/Hr) IV Continuous <Continuous>  lisinopril 10 milliGRAM(s) Oral daily  mupirocin 2% Ointment 1 Application(s) Both Nostrils two times a day  polyethylene glycol 3350 17 Gram(s) Oral daily  senna 2 Tablet(s) Oral at bedtime  sodium phosphate 15 milliMole(s)/250 mL IVPB 15 milliMole(s) IV Intermittent once  timolol 0.5% Solution 1 Drop(s) Right EYE two times a day    MEDICATIONS  (PRN):  aluminum hydroxide/magnesium hydroxide/simethicone Suspension 30 milliLiter(s) Oral every 4 hours PRN Dyspepsia  HYDROmorphone  Injectable 0.5 milliGRAM(s) IV Push every 6 hours PRN Severe Pain (7 - 10)  magnesium hydroxide Suspension 30 milliLiter(s) Oral daily PRN Constipation  melatonin 3 milliGRAM(s) Oral at bedtime PRN Insomnia  ondansetron Injectable 4 milliGRAM(s) IV Push every 8 hours PRN Nausea and/or Vomiting  ondansetron Injectable 4 milliGRAM(s) IV Push every 6 hours PRN Nausea and/or Vomiting  oxyCODONE    IR 5 milliGRAM(s) Oral every 4 hours PRN Severe Pain (7 - 10)  traMADol 25 milliGRAM(s) Oral every 4 hours PRN Mild Pain (1 - 3)  traMADol 50 milliGRAM(s) Oral every 4 hours PRN Moderate Pain (4 - 6)      PAST MEDICAL & SURGICAL HISTORY:  High cholesterol      HTN (hypertension)      History of fracture of femur          FAMILY HISTORY:  FHx: colon cancer (Father)        SOCIAL HISTORY: No EtOH, no tobacco    REVIEW OF SYSTEMS:    CONSTITUTIONAL: No weakness, fevers or chills  EYES/ENT: blind  NECK: No pain or stiffness  RESPIRATORY: No cough, wheezing, hemoptysis; No shortness of breath  CARDIOVASCULAR: No chest pain or palpitations  GASTROINTESTINAL: No abdominal or epigastric pain. No nausea, vomiting, or hematemesis; No diarrhea or constipation. No melena or hematochezia.  GENITOURINARY: No dysuria, frequency or hematuria  NEUROLOGICAL: No numbness or weakness  SKIN: No itching, burning, rashes, or lesions   All other review of systems is negative unless indicated above.        T(F): 97.9 (05-06-25 @ 08:57), Max: 98.6 (05-05-25 @ 19:05)  HR: 71 (05-06-25 @ 08:57)  BP: 145/80 (05-06-25 @ 08:57)  RR: 18 (05-06-25 @ 08:57)  SpO2: 93% (05-06-25 @ 08:57)  Wt(kg): --    GENERAL: NAD,   HEAD:  Atraumatic, Normocephalic  EYES: cloudy   NECK: Supple, No JVD  CHEST/LUNG: Clear to auscultation bilaterally; No wheeze  HEART: Regular rate and rhythm; No murmurs, rubs, or gallops  ABDOMEN: Soft, Nontender, Nondistended; Bowel sounds present  EXTREMITIES:  large mass noted on R back of shoulder/ Axilla area, L sided weakness  NEUROLOGY: non-focal  SKIN: No rashes or lesions                          10.1   9.11  )-----------( 125      ( 06 May 2025 04:22 )             29.6       05-06    141  |  109[H]  |  15.5  ----------------------------<  102[H]  4.6   |  22.0  |  0.60    Ca    8.0[L]      06 May 2025 04:22  Phos  2.1     05-05  Mg     1.5     05-05    TPro  5.4[L]  /  Alb  2.9[L]  /  TBili  0.8  /  DBili  x   /  AST  32[H]  /  ALT  34[H]  /  AlkPhos  76  05-05      Magnesium: 1.5 mg/dL (05-05 @ 13:15)  Phosphorus: 2.1 mg/dL (05-05 @ 13:15)    < from: CT Abdomen and Pelvis No Cont (05.03.25 @ 08:48) >  ACC: [id number] EXAM:  CT ABDOMEN AND PELVIS   ORDERED BY: XANDER DESIR     ACC: [id number] EXAM:  CT CHEST   ORDERED BY: XANDER DESIR     PROCEDURE DATE:  05/03/2025          INTERPRETATION:  CLINICAL INFORMATION: Distal femur fracture in setting   of possible bony metastases.    COMPARISON: Left femur CT and radiographs from May 02, 2025 were reviewed.    CONTRAST/COMPLICATIONS:  IV Contrast: NONE  Oral Contrast: NONE  .    PROCEDURE:  CT of the Chest, Abdomen and Pelvis was performed.  Sagittal and coronal reformats were performed.    FINDINGS:  CHEST:  LUNGS AND LARGE AIRWAYS: Patent central airways. No pulmonary nodules.   Mild biapical scar. Peripheral reticular opacities in the anterior left   lower lobe, lingula and right middle lobe compatible with interstitial   lung disease. Platelike atelectasis at both lung bases.  PLEURA: No pleural effusion.  VESSELS: Atherosclerotic arterial calcifications, including coronary   artery calcification.  HEART: Heart size is normal. No pericardial effusion.  MEDIASTINUM AND SARMAD: No lymphadenopathy.  CHEST WALL AND LOWER NECK: 5.5 x 7.5 x 6.5 cm partially calcified soft   tissue mass in the lateral aspect of the right axilla.    ABDOMEN AND PELVIS:  LIVER: Within normal limits.  BILE DUCTS: Normal caliber.  GALLBLADDER: Within normal limits.  SPLEEN: Within normal limits.  PANCREAS: Within normal limits.  ADRENALS: Within normal limits.  KIDNEYS/URETERS: Right renal cyst. 15 mm hyperattenuating lesion at the   upper pole of the rightkidney, indeterminate.    BLADDER: Within normal limits.  REPRODUCTIVE ORGANS: 1.3 cm simple appearing left adnexal cyst. Scattered   uterine calcifications suggestive of fibroids.    BOWEL: No bowel obstruction. Appendix is normal. Colonic diverticulosis.   Small hiatal hernia.  PERITONEUM/RETROPERITONEUM: Within normal limits.  VESSELS: Atherosclerotic changes.  LYMPH NODES: No lymphadenopathy.  MUSCULOSKELETAL: Fatty atrophy of the left psoas and gluteal muscles,   suggestive of disuse atrophy. Diffuse expansion of the left thigh   musculature which could be due to edema or intramuscular blood.  No lytic or blastic bony lesion. Degenerative changes in the spine, both   shoulders and both hips. Status post left hip internal fixation. Moderate   L3 compression deformity. Old left rib fractures. Sacral Tarlov cyst.    IMPRESSION:  7.5 cm indeterminate mass in the lateral aspect of the right axilla,   poorly characterized on this unenhanced CT and suspect for neoplasm.   Recommend contrast-enhanced MRI to better evaluate.    Indeterminate 15 mm right renal lesion, possibly a hemorrhagic cyst.   Recommend further evaluation with targeted ultrasound as neoplasm is also   in the differential diagnosis.    Mild fibrotic change in the anterior aspect of the lower lungs.        --- End of Report ---        < end of copied text >  < from: MR Femur w/wo IV Cont, Left (05.03.25 @ 12:30) >  ACC: [id number] EXAM:  MR FEMUR WAW IC LT   ORDERED BY: XANDER DESIR     PROCEDURE DATE:  05/03/2025          INTERPRETATION:  Clinical Information: Distal femoral fracture    Comparison: CT scan of the left femur from 5/2/2025 and left knee   radiographs from 5/2/2025.    Technique:  MRI of the left femur.  Intravenous Contrast: 5.5 cc Gadavist contrast were administered and 2.0   cc were discarded.    Findings:    Again noted is a acute comminuted, intra-articular fracture of the left   distal femur beginning at the distal femoral metadiaphysis medially.   There is 0.7 cm of medial displacement of the major distal fracture   fragment. There is signal abnormality at this location which appears   somewhat ovoid (4, 13) and extends into the adjacent soft tissues   greatest posteriorly (10, 26) and posterior medially at the cranial   aspect which is favored to be related to an underlying lesion. The   adductor armida inserts into the region of the soft tissue mass. There   are small rounded foci of signal abnormality just above the fracture line   laterally and posterior laterally which are suspicious for additional   foci of metastatic disease. There is knee lipohemarthrosis again noted.    There is extensive intramuscular edema greatest within the vastus   intermedius and medialis muscles . Edema extends into the proximally   imaged posterior compartment of the leg. There is subcutaneous edema   greatest laterally. There is a small left hip joint effusion partially   imaged. There is a partially imaged left greater trochanteric bursitis.    Impression:  Acute, comminuted, intra-articular distal femoral fracture which appears   pathologic in etiology with small additional foci of metastatic disease.    --- End of Report ---

## 2025-05-06 NOTE — PHYSICAL THERAPY INITIAL EVALUATION ADULT - IMPAIRMENTS FOUND, PT EVAL
aerobic capacity/endurance/arousal, attention, and cognition/decreased midline orientation/gait, locomotion, and balance/muscle strength/poor safety awareness/posture/ROM

## 2025-05-06 NOTE — DIETITIAN INITIAL EVALUATION ADULT - PERTINENT LABORATORY DATA
05-06    141  |  109[H]  |  15.5  ----------------------------<  102[H]  4.6   |  22.0  |  0.60    Ca    8.0[L]      06 May 2025 04:22  Phos  2.1     05-05  Mg     1.5     05-05    TPro  5.4[L]  /  Alb  2.9[L]  /  TBili  0.8  /  DBili  x   /  AST  32[H]  /  ALT  34[H]  /  AlkPhos  76  05-05

## 2025-05-06 NOTE — PHYSICAL THERAPY INITIAL EVALUATION ADULT - [id number]_GEN_A_CORE
I have personally performed a face to face diagnostic evaluation on this patient. I have reviewed the ACP note and agree with the history, exam and plan of care, except as noted. rolling walker/wheelchair

## 2025-05-06 NOTE — CONSULT NOTE ADULT - ASSESSMENT
in progress   89yoF w/ PMHx blindness, glaucoma, retinal detachment, HTN, atrial flutter and hx PE on eliquis who presents to the ED with L leg pain s/p mechanical fall at home presented to [place] ED w/ L leg pain s/p mechanical fall at home Concern for pathologic fx f L femur, CT A/P showing axillary mass. Transferred to [facility] s/p ORIF L Femur 5/5 w/ Dr. Giordano    5/3/25- CT CAP-7.5 cm indeterminate mass in the lateral aspect of the right axilla, poorly characterized on this unenhanced CT and suspect for neoplasm.  Indeterminate 15 mm right renal lesion, possibly a hemorrhagic cyst vs neoplasm Mild fibrotic change in the anterior aspect of the lower lungs.  5/3/25-MRI L femur-Acute, comminuted, intra-articular distal femoral fracture which appears pathologic in etiology with small additional foci of metastatic disease.    #Pathological FX (L Femur)  #R Axillary soft tissue mass  # Indeterminate 15 mm right renal lesion  -patient seen by heme/onc Dr. Cowan at [facility]  -s/p ORIF L femur w Dr. Giordano -bone sent for BX    RECS  -f/u path from bone BX 5/5  -IR eval for R axilla mass BX  -patient can fu w/ Dr. Cowan on d/c        *Note not finalized until signed by Attending Physician    Thank you for the referral. Will continue to monitor the patient.  Please call with any questions [phone]  Above reviewed with Attending Dr. Saucedo     [place] [address] [place] [address]  [phone]              in progress   89yoF w/ PMHx blindness, glaucoma, retinal detachment, HTN, atrial flutter and hx PE on eliquis who presents to the ED with L leg pain s/p mechanical fall at home presented to [place] ED w/ L leg pain s/p mechanical fall at home Concern for pathologic fx f L femur, CT A/P showing axillary mass. Transferred to [facility] s/p ORIF L Femur 5/5 w/ Dr. Giordano    5/3/25- CT CAP-7.5 cm indeterminate mass in the lateral aspect of the right axilla, poorly characterized on this unenhanced CT and suspect for neoplasm.  Indeterminate 15 mm right renal lesion, possibly a hemorrhagic cyst vs neoplasm Mild fibrotic change in the anterior aspect of the lower lungs.  5/3/25-MRI L femur-Acute, comminuted, intra-articular distal femoral fracture which appears pathologic in etiology with small additional foci of metastatic disease.    #Pathological FX (L Femur)  #R Axillary soft tissue mass  # Indeterminate 15 mm right renal lesion  -patient seen by heme/onc Dr. Cowan at [facility]  -patient stated was in normal health prior to fall-denies any change in appetite, weight loss, or fatigue   -patient stated has had mass by R Axilla for 60+ years never BX'd but stated believes its grown over the past year   -s/p ORIF L femur w Dr. Giordano -bone sent for BX    RECS  -f/u path from bone BX 5/5  -IR eval for R axilla mass BX  -patient can fu w/ Dr. Cowan on d/c        *Note not finalized until signed by Attending Physician    Thank you for the referral. Will continue to monitor the patient.  Please call with any questions [phone]  Above reviewed with Attending Dr. Saucedo     [facility]  [address] [place] [address]  [phone]            89yoF w/ PMHx blindness, glaucoma, retinal detachment, HTN, atrial flutter and hx PE on eliquis who presents to the ED with L leg pain s/p mechanical fall at home presented to [place] ED w/ L leg pain s/p mechanical fall at home Concern for pathologic fx f L femur, CT A/P showing axillary mass. Transferred to [facility] s/p ORIF L Femur 5/5 w/ Dr. Giordano    5/3/25- CT CAP-7.5 cm indeterminate mass in the lateral aspect of the right axilla, poorly characterized on this unenhanced CT and suspect for neoplasm.  Indeterminate 15 mm right renal lesion, possibly a hemorrhagic cyst vs neoplasm Mild fibrotic change in the anterior aspect of the lower lungs.  5/3/25-MRI L femur-Acute, comminuted, intra-articular distal femoral fracture which appears pathologic in etiology with small additional foci of metastatic disease.    #Pathological FX (L Femur)  #R Axillary soft tissue mass  # Indeterminate 15 mm right renal lesion  -patient seen by heme/onc Dr. Cowan at [facility]  -patient stated was in normal health prior to fall-denies any change in appetite, weight loss, or fatigue   -patient stated has had mass by R Axilla for 60+ years never BX'd but stated believes its grown over the past year   -s/p ORIF L femur w Dr. Giordano -bone sent for BX    RECS  -f/u path from bone BX 5/5  -IR eval for R axilla mass BX  -patient can fu w/ Dr. Cowan on d/c        *Note not finalized until signed by Attending Physician    Thank you for the referral. Will continue to monitor the patient.  Please call with any questions [phone]  Above reviewed with Attending Dr. Saucedo     [facility]  [address] [place] [address]  [phone]            89yoF w/ PMHx blindness, glaucoma, retinal detachment, HTN, atrial flutter and hx PE on eliquis who presents to the ED with L leg pain s/p mechanical fall at home presented to [place] ED w/ L leg pain s/p mechanical fall at home Concern for pathologic fx f L femur, CT A/P showing axillary mass. Transferred to [facility] s/p ORIF L Femur 5/5 w/ Dr. Giordano    5/3/25- CT CAP-7.5 cm indeterminate mass in the lateral aspect of the right axilla, poorly characterized on this unenhanced CT and suspect for neoplasm.  Indeterminate 15 mm right renal lesion, possibly a hemorrhagic cyst vs neoplasm Mild fibrotic change in the anterior aspect of the lower lungs.  5/3/25-MRI L femur-Acute, comminuted, intra-articular distal femoral fracture which appears pathologic in etiology with small additional foci of metastatic disease.    #Pathological FX (L Femur)  #R Axillary soft tissue mass  # Indeterminate 15 mm right renal lesion  -patient seen by heme/onc Dr. Cowan at [facility]  -patient stated was in normal health prior to fall-denies any change in appetite, weight loss, or fatigue   -patient stated has had mass by R Axilla for 60+ years never BX'd but stated believes its grown over the past year   -s/p ORIF L femur w Dr. Giordano -bone sent for BX    RECS  -f/u path from bone BX 5/5  -consider dedicated MRI w/wo IVC to better evaluate R axilla mass   -patient can fu w/ Dr. Cowan on d/c        *Note not finalized until signed by Attending Physician    Thank you for the referral. Will continue to monitor the patient.  Please call with any questions [phone]  Above reviewed with Attending Dr. Saucedo     [facility]  [address] [place] [address]  [phone]

## 2025-05-06 NOTE — DIETITIAN INITIAL EVALUATION ADULT - ADD RECOMMEND
1. Continue Regular diet as tolerated with meal assistance.   2. Recommend Ensure Plus HP 1x daily between meals to help meet protein needs.   3. Recommend thiamine daily to prevent refeeding syndrome, monitor and replete K, Mag, Phos prn   4. Consider obtaining vitamin D levels and supplementing accordingly.   5. Monitor weights, skin integrity, nutrition related labs

## 2025-05-06 NOTE — PHYSICAL THERAPY INITIAL EVALUATION ADULT - RANGE OF MOTION EXAMINATION, REHAB EVAL
LLE ROM painful, 100degrees knee flex available/bilateral upper extremity ROM was WFL (within functional limits)/bilateral lower extremity ROM was WFL (within functional limits)

## 2025-05-06 NOTE — PHYSICAL THERAPY INITIAL EVALUATION ADULT - PLANNED THERAPY INTERVENTIONS, PT EVAL
stairs negotiation/balance training/bed mobility training/gait training/strengthening/transfer training

## 2025-05-06 NOTE — CHART NOTE - NSCHARTNOTEFT_GEN_A_CORE
PA NOTE-MEDICINE    Called by RN due to Pt retaining 490 ccs on Bladder scan.  Pt is s/p left femur retrograde IMN with ORIF 5/5/25.    Pt was Straight Cathed x 1 in PACU for urinary retention.  Ordered 2nd Straight Cath.  Continue Q 6 Bladder scans  Will sign out to AM Team to Follow

## 2025-05-06 NOTE — PHYSICAL THERAPY INITIAL EVALUATION ADULT - LEVEL OF INDEPENDENCE: SIT/STAND, REHAB EVAL
severe fear of falling, fear of LLE being unable to support body, 4 attempts to achieve stable upright/maximum assist (25% patients effort)

## 2025-05-06 NOTE — DIETITIAN INITIAL EVALUATION ADULT - OTHER INFO
88 yo female admitted with L femur fracture after fall at home. S/p L distal femur ORIF w IM nail on 5/5. PMHx  blindness, glaucoma, HTN, atrial flutter. Noted with axillary mass, concern for metastatic disease.

## 2025-05-06 NOTE — CONSULT NOTE ADULT - NS ATTEND AMEND GEN_ALL_CORE FT
Orthopaedic Trauma Surgeon Addendum:    I have reviewed the physician assistant note and agree with the history, exam, and plan of care, except as noted.    It was explained to the patient that any surgical procedure carries with it the risks of loss of limb or loss of life. Medical complications include but are not limited to death or disability from a heart attack, stroke, GI bleeding, thrombophlebitis and pulmonary embolism, sepsis, adverse reactions including death due to blood transfusions, allergy or adverse drug reaction. There are other rare, unknown and uncommon systemic conditions that could also adversely affect the systemic outcome. Local complications include but are not limited to wound dehiscence, deep infection, failure of fixation or reconstruction, damage to nerves and vessels which could be temporary or permanent, as well as other rare, uncommon and unknown local complications that may necessitate re-operation, more complex orthopaedic reconstructions or amputation.     Osteopenia and osteoporosis are significant risk factors for fragility fractures. Given the type of fracture that has occurred, I would highly recommend that the patient followup with their primary care physician to discuss treatment for low bone mineral density. We discussed the benefits of these medications frequently far outweigh the small risks. Their primary care physician can call the office with any specific questions or concerns.     Fracture is likely pathologic. Will take bone samples for pathology. Plan for IMN/ORIF in AM.    Shay Dolan MD  Orthopaedic Trauma Surgeon  [facility] [place]
89yoF w/ PMHx blindness, glaucoma, retinal detachment, HTN, atrial flutter and hx PE on eliquis who presents to the ED with L leg pain s/p mechanical fall, sustained left femur fracture. S/p ORIF IM nail.  On CT c/a/p - found to have right posterior axillary calcified soft tissue mass and MRI femur with foci of metastatic disease.  Pending pathology from femur  suggest MRI w/wo of right posterior axillary soft tissue mass to characterize it further - may need biopsy, appears to be a separate process from the bones.   Can follow up with Dr. Camryn GARCIA oncologist in [place] post discharge

## 2025-05-06 NOTE — PHYSICAL THERAPY INITIAL EVALUATION ADULT - GENERAL OBSERVATIONS, REHAB EVAL
received in [place], on 2L O2, tele, SCDs, spoke with PITO Hair for RA trials pt remained 95%, reports 0/10 pain prior

## 2025-05-06 NOTE — PHYSICAL THERAPY INITIAL EVALUATION ADULT - ADDITIONAL COMMENTS
reports living in daughters home with 5STE with R HR through back entrance, has RW, daughter provides 1A for all MRADLs and IADLs secondary to vision deficits. CHAI assists with stair negotiation and community navigation

## 2025-05-06 NOTE — DIETITIAN INITIAL EVALUATION ADULT - ORAL INTAKE PTA/DIET HISTORY
Pt visited, follows regular diet at home with foods prepared by daughter due to pt's blindness. Pt states that she tends to eat foods that are "less messy" like cereal and milk, rolls, sandwiches, and whatever dtr prepares for dinner. Pt requires meal set-up and prefers to drink beverages through straw. Pt reports nausea yesterday but improved symptoms today. Denies difficulty chewing/swallowing. Unable to recall usual body weight, no weight hx available in chart.

## 2025-05-07 ENCOUNTER — TRANSCRIPTION ENCOUNTER (OUTPATIENT)
Age: 89
End: 2025-05-07

## 2025-05-07 LAB
% ALBUMIN: 53.7 % — SIGNIFICANT CHANGE UP
% ALPHA 1: 5.1 % — SIGNIFICANT CHANGE UP
% ALPHA 2: 10.6 % — SIGNIFICANT CHANGE UP
% BETA: 16 % — SIGNIFICANT CHANGE UP
% GAMMA: 14.6 % — SIGNIFICANT CHANGE UP
ALBUMIN SERPL ELPH-MCNC: 2.5 G/DL — LOW (ref 3.3–5.2)
ALBUMIN SERPL ELPH-MCNC: 3.1 G/DL — LOW (ref 3.6–5.5)
ALBUMIN/GLOB SERPL ELPH: 1.1 RATIO — SIGNIFICANT CHANGE UP
ALP SERPL-CCNC: 192 U/L — HIGH (ref 40–120)
ALPHA1 GLOB SERPL ELPH-MCNC: 0.3 G/DL — SIGNIFICANT CHANGE UP (ref 0.1–0.4)
ALPHA2 GLOB SERPL ELPH-MCNC: 0.6 G/DL — SIGNIFICANT CHANGE UP (ref 0.5–1)
ALT FLD-CCNC: 77 U/L — HIGH
ANION GAP SERPL CALC-SCNC: 10 MMOL/L — SIGNIFICANT CHANGE UP (ref 5–17)
AST SERPL-CCNC: 129 U/L — HIGH
B-GLOBULIN SERPL ELPH-MCNC: 0.9 G/DL — SIGNIFICANT CHANGE UP (ref 0.5–1)
BILIRUB SERPL-MCNC: 1.1 MG/DL — SIGNIFICANT CHANGE UP (ref 0.4–2)
BUN SERPL-MCNC: 17.7 MG/DL — SIGNIFICANT CHANGE UP (ref 8–20)
CALCIUM SERPL-MCNC: 8 MG/DL — LOW (ref 8.4–10.5)
CHLORIDE SERPL-SCNC: 107 MMOL/L — SIGNIFICANT CHANGE UP (ref 96–108)
CO2 SERPL-SCNC: 23 MMOL/L — SIGNIFICANT CHANGE UP (ref 22–29)
CREAT SERPL-MCNC: 0.55 MG/DL — SIGNIFICANT CHANGE UP (ref 0.5–1.3)
EGFR: 88 ML/MIN/1.73M2 — SIGNIFICANT CHANGE UP
EGFR: 88 ML/MIN/1.73M2 — SIGNIFICANT CHANGE UP
GAMMA GLOBULIN: 0.8 G/DL — SIGNIFICANT CHANGE UP (ref 0.6–1.6)
GLUCOSE SERPL-MCNC: 102 MG/DL — HIGH (ref 70–99)
HCT VFR BLD CALC: 29.7 % — LOW (ref 34.5–45)
HGB BLD-MCNC: 9.6 G/DL — LOW (ref 11.5–15.5)
INR BLD: 1.03 RATIO — SIGNIFICANT CHANGE UP (ref 0.85–1.16)
MCHC RBC-ENTMCNC: 29.3 PG — SIGNIFICANT CHANGE UP (ref 27–34)
MCHC RBC-ENTMCNC: 32.3 G/DL — SIGNIFICANT CHANGE UP (ref 32–36)
MCV RBC AUTO: 90.5 FL — SIGNIFICANT CHANGE UP (ref 80–100)
NRBC # BLD AUTO: 0 K/UL — SIGNIFICANT CHANGE UP (ref 0–0)
NRBC # FLD: 0 K/UL — SIGNIFICANT CHANGE UP (ref 0–0)
NRBC BLD AUTO-RTO: 0 /100 WBCS — SIGNIFICANT CHANGE UP (ref 0–0)
PLATELET # BLD AUTO: 143 K/UL — LOW (ref 150–400)
PMV BLD: 10.5 FL — SIGNIFICANT CHANGE UP (ref 7–13)
POTASSIUM SERPL-MCNC: 4.2 MMOL/L — SIGNIFICANT CHANGE UP (ref 3.5–5.3)
POTASSIUM SERPL-SCNC: 4.2 MMOL/L — SIGNIFICANT CHANGE UP (ref 3.5–5.3)
PROT PATTERN SERPL ELPH-IMP: SIGNIFICANT CHANGE UP
PROT SERPL-MCNC: 4.5 G/DL — LOW (ref 6.6–8.7)
PROT SERPL-MCNC: 5.8 G/DL — LOW (ref 6–8.3)
PROTHROM AB SERPL-ACNC: 12 SEC — SIGNIFICANT CHANGE UP (ref 9.9–13.4)
RBC # BLD: 3.28 M/UL — LOW (ref 3.8–5.2)
RBC # FLD: 17.2 % — HIGH (ref 10.3–14.5)
SODIUM SERPL-SCNC: 140 MMOL/L — SIGNIFICANT CHANGE UP (ref 135–145)
WBC # BLD: 7.13 K/UL — SIGNIFICANT CHANGE UP (ref 3.8–10.5)
WBC # FLD AUTO: 7.13 K/UL — SIGNIFICANT CHANGE UP (ref 3.8–10.5)

## 2025-05-07 PROCEDURE — 99232 SBSQ HOSP IP/OBS MODERATE 35: CPT

## 2025-05-07 RX ADMIN — MUPIROCIN CALCIUM 1 APPLICATION(S): 20 CREAM TOPICAL at 05:08

## 2025-05-07 RX ADMIN — LISINOPRIL 10 MILLIGRAM(S): 5 TABLET ORAL at 05:10

## 2025-05-07 RX ADMIN — BRIMONIDINE TARTRATE 1 DROP(S): 1.5 SOLUTION/ DROPS OPHTHALMIC at 18:48

## 2025-05-07 RX ADMIN — Medication 650 MILLIGRAM(S): at 06:05

## 2025-05-07 RX ADMIN — Medication 650 MILLIGRAM(S): at 13:33

## 2025-05-07 RX ADMIN — POLYETHYLENE GLYCOL 3350 17 GRAM(S): 17 POWDER, FOR SOLUTION ORAL at 13:36

## 2025-05-07 RX ADMIN — LISINOPRIL 25 MILLIGRAM(S): 30 TABLET ORAL at 05:08

## 2025-05-07 RX ADMIN — OXYCODONE HYDROCHLORIDE 5 MILLIGRAM(S): 30 TABLET ORAL at 10:21

## 2025-05-07 RX ADMIN — Medication 2 TABLET(S): at 21:19

## 2025-05-07 RX ADMIN — Medication 63.75 MILLIMOLE(S): at 04:48

## 2025-05-07 RX ADMIN — Medication 650 MILLIGRAM(S): at 22:20

## 2025-05-07 RX ADMIN — Medication 650 MILLIGRAM(S): at 05:07

## 2025-05-07 RX ADMIN — OXYCODONE HYDROCHLORIDE 5 MILLIGRAM(S): 30 TABLET ORAL at 11:05

## 2025-05-07 RX ADMIN — Medication 650 MILLIGRAM(S): at 21:19

## 2025-05-07 RX ADMIN — TIMOLOL MALEATE 1 DROP(S): 6.8 SOLUTION OPHTHALMIC at 05:06

## 2025-05-07 RX ADMIN — BRIMONIDINE TARTRATE 1 DROP(S): 1.5 SOLUTION/ DROPS OPHTHALMIC at 05:09

## 2025-05-07 RX ADMIN — TAMSULOSIN HYDROCHLORIDE 0.4 MILLIGRAM(S): 0.4 CAPSULE ORAL at 21:19

## 2025-05-07 RX ADMIN — MUPIROCIN CALCIUM 1 APPLICATION(S): 20 CREAM TOPICAL at 18:48

## 2025-05-07 RX ADMIN — TIMOLOL MALEATE 1 DROP(S): 6.8 SOLUTION OPHTHALMIC at 18:46

## 2025-05-07 RX ADMIN — APIXABAN 2.5 MILLIGRAM(S): 2.5 TABLET, FILM COATED ORAL at 05:08

## 2025-05-07 RX ADMIN — APIXABAN 2.5 MILLIGRAM(S): 2.5 TABLET, FILM COATED ORAL at 18:46

## 2025-05-07 NOTE — PATIENT PROFILE ADULT - SAFE PLACE TO LIVE
"              After Visit Summary   2017    Tamra Houston    MRN: [id number]           Patient Information     Date Of Birth          1997        Visit Information        Provider Department      2017 2:30 PM Jada Agustin APRN [facility]        Today's Diagnoses     Encounter for supervision of normal first pregnancy in third trimester    -  1    Need for Tdap vaccination           Follow-ups after your visit        Who to contact     If you have questions or need follow up information about today's clinic visit or your schedule please contact [facility] directly at [phone].  Normal or non-critical lab and imaging results will be communicated to you by myRetehart, letter or phone within 4 business days after the clinic has received the results. If you do not hear from us within 7 days, please contact the clinic through myRetehart or phone. If you have a critical or abnormal lab result, we will notify you by phone as soon as possible.  Submit refill requests through WappZapp or call your pharmacy and they will forward the refill request to us. Please allow 3 business days for your refill to be completed.          Additional Information About Your Visit        MyChart Information     W[employer]app lets you send messages to your doctor, view your test results, renew your prescriptions, schedule appointments and more. To sign up, go to [email or web address] . Click on \"Log in\" on the left side of the screen, which will take you to the Welcome page. Then click on \"Sign up Now\" on the right side of the page.     You will be asked to enter the access code listed below, as well as some personal information. Please follow the directions to create your username and password.     Your access code is: [email or web address]  Expires: 2017 11:58 AM     Your access code will  in 90 days. If you need help or a new code, please call your [facility] or [phone].      "   Care EveryWhere ID     This is your Care EveryWhere ID. This could be used by other organizations to access your [place] medical records  [id number]        Your Vitals Were     Pulse BMI (Body Mass Index)                74 29.48 kg/m2           Blood Pressure from Last 3 Encounters:   06/14/17 116/72   05/09/17 110/74   04/18/17 105/67    Weight from Last 3 Encounters:   06/14/17 156 lb (70.8 kg) (86 %)*   05/09/17 143 lb 12.8 oz (65.2 kg) (76 %)*   04/18/17 143 lb (64.9 kg) (75 %)*     * Growth percentiles are based on [facility] 2-20 Years data.              We Performed the Following     TDAP (ADACEL)        Primary Care Provider Office Phone # Fax #    Olga Chahal [phone]       [address] [address]        Thank you!     Thank you for choosing [facility]  for your care. Our goal is always to provide you with excellent care. Hearing back from our patients is one way we can continue to improve our services. Please take a few minutes to complete the written survey that you may receive in the mail after your visit with us. Thank you!             Your Updated Medication List - Protect others around you: Learn how to safely use, store and throw away your medicines at www.disposemymeds.org.          This list is accurate as of: 6/14/17  3:11 PM.  Always use your most recent med list.                   Brand Name Dispense Instructions for use    ADULT ONE DAILY GUMMIES Chew     100 tablet    Take 1 tablet by mouth daily       albuterol (2.5 MG/3ML) 0.083% neb solution      Take 1 ampule by nebulization every 6 hours as needed Reported on 3/29/2017       ferrous gluconate 324 (38 FE) MG tablet    FERGON    100 tablet    Take 1 tablet (324 mg) by mouth daily (with breakfast)          no

## 2025-05-07 NOTE — PROGRESS NOTE ADULT - ASSESSMENT
89yoF w/ PMHx blindness, glaucoma, HTN, atrial flutter and hx PE on eliquis who presents to the ED with L leg pain s/p mechanical fall at home, admitted with L femur fracture.    #left femur fracture   - XR showing acute comminuted displaced supracondylar fracture of the left femur; CT showing L femur showing acute impacted displaced intra-articular fracture of the distal femur with large lipohemarthrosis at the knee. Findings of an underlying possible lesion are present at the site of fracture, with additional findings of abnormal rounded foci of soft tissue within the marrow, suggesting the possibility of underlying metastatic disease. Strongly advise further evaluation with femur MRI  - MRI femur: Acute, comminuted, intra-articular distal femoral fracture which appears pathologic in etiology with small additional foci of metastatic disease.  - CT C/A/P: 7.5cm indeterminate mass in the lateral aspect of the right axilla, poorly characterized on this unenhanced CT and suspect for neoplasm. Recommend contrast-enhanced MRI to better evaluate. Indeterminate 15 mm right renal lesion, possibly a hemorrhagic cyst. Recommend further evaluation with targeted ultrasound as neoplasm is also in the differential diagnosis.  Mild fibrotic change in the anterior aspect of the lower lungs.  - ortho consulted recommending transfer to [facility] to have surgical intervention with Dr. Shay Dolan   - pain control: tylenol, dilaudid  - zofran PRN for nausea  - bowel regimen  - S/p ORIF yesterday  - PT - WBAT    #metastatic disease  #Axillary mass  - imaging concerning for metastatic disease  - axillary mass noted on CT   - Heme/Onc consult appreciated  - Discussed with patient, states she does not want any work up and would not want chemotherapy if offered. Will refer outpatient to Oncology if patient changes mind    #renal lesion  - possible hemorrhagic cyst, seen on CT   - Outpatient follow up.      #Atrial flutter   - continue Eliquis  - home nebivolol -> atenolol with hold parameters  - maintain telemetry     #HTN  - well controlled currently   - home ramipril -> lisinopril with hold parameters.    #Glaucoma.   - CT HEAD: Possible right globe retinal detachment. Recommend ophthalmologic consultation. No acute intracranial hemorrhage, mass effect, or midline shift. Chronic findings as above.  - pt with blindness follows with ophtho outpatient - hx retinal detachment per outpatient notes  - continue home eye drops  - pt requires assistance with eating    Dispo: Pending PT  HCP: Caren daughter who patient lives with   
89yoF w/ PMHx blindness, glaucoma, HTN, atrial flutter and hx PE on eliquis who presents to the ED with L leg pain s/p mechanical fall at home, admitted with L femur fracture.    #left femur fracture   - XR showing acute comminuted displaced supracondylar fracture of the left femur; CT showing L femur showing acute impacted displaced intra-articular fracture of the distal femur with large lipohemarthrosis at the knee. Findings of an underlying possible lesion are present at the site of fracture, with additional findings of abnormal rounded foci of soft tissue within the marrow, suggesting the possibility of underlying metastatic disease. Strongly advise further evaluation with femur MRI  - MRI femur: Acute, comminuted, intra-articular distal femoral fracture which appears pathologic in etiology with small additional foci of metastatic disease.  - CT C/A/P: 7.5cm indeterminate mass in the lateral aspect of the right axilla, poorly characterized on this unenhanced CT and suspect for neoplasm. Recommend contrast-enhanced MRI to better evaluate. Indeterminate 15 mm right renal lesion, possibly a hemorrhagic cyst. Recommend further evaluation with targeted ultrasound as neoplasm is also in the differential diagnosis.  Mild fibrotic change in the anterior aspect of the lower lungs.  - ortho consulted recommending transfer to [facility] to have surgical intervention with Dr. Shay Dolan   - pain control: tylenol, dilaudid  - zofran PRN for nausea  - bowel regimen  - S/p ORIF  - PT - WBAT    #metastatic disease  #Axillary mass  - imaging concerning for metastatic disease  - axillary mass noted on CT   - Heme/Onc consult appreciated  - Discussed with patient, states she does not want any work up and would not want chemotherapy if offered. However per discussion with family and patient today, patient may be open to biopsy. Will refer outpatient for further imaging and biopsy.     #renal lesion  - possible hemorrhagic cyst, seen on CT   - Outpatient follow up.      #Atrial flutter   - continue Eliquis  - home nebivolol -> atenolol with hold parameters  - maintain telemetry     #HTN  - well controlled currently   - home ramipril -> lisinopril with hold parameters.    #Glaucoma.   - CT HEAD: Possible right globe retinal detachment. Recommend ophthalmologic consultation. No acute intracranial hemorrhage, mass effect, or midline shift. Chronic findings as above.  - pt with blindness follows with ophtho outpatient - hx retinal detachment per outpatient notes  - continue home eye drops  - pt requires assistance with eating    #Transaminitis  - mild  -  monitor LFTs    Dispo: GIUSEPPE  HCP: Caren daughter who patient lives with     
89yoF w/ PMHx blindness, glaucoma, HTN, atrial flutter and hx PE on eliquis who presents to the ED with L leg pain s/p mechanical fall at home, admitted with L femur fracture.    #left femur fracture   - XR showing acute comminuted displaced supracondylar fracture of the left femur; CT showing L femur showing acute impacted displaced intra-articular fracture of the distal femur with large lipohemarthrosis at the knee. Findings of an underlying possible lesion are present at the site of fracture, with additional findings of abnormal rounded foci of soft tissue within the marrow, suggesting the possibility of underlying metastatic disease. Strongly advise further evaluation with femur MRI  - MRI femur: Acute, comminuted, intra-articular distal femoral fracture which appears pathologic in etiology with small additional foci of metastatic disease.  - CT C/A/P: 7.5cm indeterminate mass in the lateral aspect of the right axilla, poorly characterized on this unenhanced CT and suspect for neoplasm. Recommend contrast-enhanced MRI to better evaluate. Indeterminate 15 mm right renal lesion, possibly a hemorrhagic cyst. Recommend further evaluation with targeted ultrasound as neoplasm is also in the differential diagnosis.  Mild fibrotic change in the anterior aspect of the lower lungs.  - ortho consulted recommending transfer to [facility] to have surgical intervention with Dr. Shay Dolan   - pt on eliquis 2.5mg BID for atrial flutter and hx PE, started on heparin gtt for AC given planned procedure  - pain control: tylenol, dilaudid  - zofran PRN for nausea  - bowel regimen  - Pt denies any CP, SOB, respiratory illness, steroids use. Hemodynamically stable, on RA. No absolute contraindication from medical perspective to proceed with orthopedic surgery for left femur fracture however patient at elevated risk given advanced age    - cardio consulted at [place]: she is optimized from cardiovascular standpoint to proceed with planned procedure pending TTE  - Echo at [place]: Left ventricular endocardium is not well visualized; however, the left ventricular systolic function appears grossly normal  - ortho consulted, planned for surgery tomorrow, NPO tomorrow, bone specimen at time of left femur surgery    #metastatic disease  - imaging concerning for metastatic disease  - axillary mass noted on CT   - heme/onc consult, will see if MRI w/ contrast needed to better classify axillary mass as CT recommends and if R axilla mass biopsy needed   - possible bone specimen at time of L femur surgery   - PT post surgery     #renal lesion  - possible hemorrhagic cyst, seen on CT   - plan for u/s after surgery as patient having a lot of pain with any movement at this time     #Atrial flutter   - continue heparin gtt, eliquis post procedure  - home nebivolol -> atenolol with hold parameters  - maintain telemetry     #HTN  - well controlled currently   - home ramipril -> lisinopril with hold parameters.    #Glaucoma.   - CT HEAD: Possible right globe retinal detachment. Recommend ophthalmologic consultation. No acute intracranial hemorrhage, mass effect, or midline shift. Chronic findings as above.  - pt with blindness follows with ophtho outpatient - hx retinal detachment per outpatient notes  - continue home eye drops  - pt requires assistance with eating    Dispo: acute >4 days, pending surgery and biopsy; possible transfer back to [place] post surgery   HCP: Caren daughter who patient lives with

## 2025-05-07 NOTE — DISCHARGE NOTE NURSING/CASE MANAGEMENT/SOCIAL WORK - NSDCPEFALRISK_GEN_ALL_CORE
For information on Fall & Injury Prevention, visit: [email or web address] OR  [email or web address] OR  https://www.cdc.gov/steadi/patient.html

## 2025-05-07 NOTE — PATIENT PROFILE ADULT - FALL HARM RISK - HARM RISK INTERVENTIONS

## 2025-05-07 NOTE — DISCHARGE NOTE NURSING/CASE MANAGEMENT/SOCIAL WORK - FINANCIAL ASSISTANCE
[facility] provides services at a reduced cost to those who are determined to be eligible through [facility]’s financial assistance program. Information regarding [facility]’s financial assistance program can be found by going to [email or web address] or by calling [phone].

## 2025-05-07 NOTE — PROGRESS NOTE ADULT - NUTRITIONAL ASSESSMENT
This patient has been assessed with a concern for Malnutrition and has been determined to have a diagnosis/diagnoses of Moderate protein-calorie malnutrition and Underweight (BMI < 19).    This patient is being managed with:   Diet Regular-  Entered: May  6 2025  7:30AM    Diet NPO after Midnight-     NPO Start Date: 04-May-2025   NPO Start Time: 23:59  Except Medications  With Ice Chips/Sips of Water  Entered: May  4 2025  5:34PM

## 2025-05-07 NOTE — DISCHARGE NOTE NURSING/CASE MANAGEMENT/SOCIAL WORK - PATIENT PORTAL LINK FT
You can access the FollowMyHealth Patient Portal offered by [facility] by registering at the following website: [email or web address]. By joining [employer]’s FollowMyHealth portal, you will also be able to view your health information using other applications (apps) compatible with our system.

## 2025-05-08 VITALS
RESPIRATION RATE: 18 BRPM | SYSTOLIC BLOOD PRESSURE: 135 MMHG | OXYGEN SATURATION: 92 % | HEART RATE: 78 BPM | DIASTOLIC BLOOD PRESSURE: 83 MMHG | TEMPERATURE: 98 F

## 2025-05-08 LAB
ALBUMIN SERPL ELPH-MCNC: 2.5 G/DL — LOW (ref 3.3–5.2)
ALP SERPL-CCNC: 233 U/L — HIGH (ref 40–120)
ALT FLD-CCNC: 96 U/L — HIGH
ANION GAP SERPL CALC-SCNC: 10 MMOL/L — SIGNIFICANT CHANGE UP (ref 5–17)
AST SERPL-CCNC: 106 U/L — HIGH
BILIRUB SERPL-MCNC: 0.9 MG/DL — SIGNIFICANT CHANGE UP (ref 0.4–2)
BUN SERPL-MCNC: 17.7 MG/DL — SIGNIFICANT CHANGE UP (ref 8–20)
CALCIUM SERPL-MCNC: 8.3 MG/DL — LOW (ref 8.4–10.5)
CHLORIDE SERPL-SCNC: 108 MMOL/L — SIGNIFICANT CHANGE UP (ref 96–108)
CO2 SERPL-SCNC: 24 MMOL/L — SIGNIFICANT CHANGE UP (ref 22–29)
CREAT SERPL-MCNC: 0.57 MG/DL — SIGNIFICANT CHANGE UP (ref 0.5–1.3)
EGFR: 87 ML/MIN/1.73M2 — SIGNIFICANT CHANGE UP
EGFR: 87 ML/MIN/1.73M2 — SIGNIFICANT CHANGE UP
GLUCOSE SERPL-MCNC: 100 MG/DL — HIGH (ref 70–99)
HCT VFR BLD CALC: 29.9 % — LOW (ref 34.5–45)
HGB BLD-MCNC: 9.7 G/DL — LOW (ref 11.5–15.5)
INR BLD: 1.15 RATIO — SIGNIFICANT CHANGE UP (ref 0.85–1.16)
MCHC RBC-ENTMCNC: 29.8 PG — SIGNIFICANT CHANGE UP (ref 27–34)
MCHC RBC-ENTMCNC: 32.4 G/DL — SIGNIFICANT CHANGE UP (ref 32–36)
MCV RBC AUTO: 92 FL — SIGNIFICANT CHANGE UP (ref 80–100)
NRBC # BLD AUTO: 0 K/UL — SIGNIFICANT CHANGE UP (ref 0–0)
NRBC # FLD: 0 K/UL — SIGNIFICANT CHANGE UP (ref 0–0)
NRBC BLD AUTO-RTO: 0 /100 WBCS — SIGNIFICANT CHANGE UP (ref 0–0)
PLATELET # BLD AUTO: 175 K/UL — SIGNIFICANT CHANGE UP (ref 150–400)
PMV BLD: 10.5 FL — SIGNIFICANT CHANGE UP (ref 7–13)
POTASSIUM SERPL-MCNC: 4.3 MMOL/L — SIGNIFICANT CHANGE UP (ref 3.5–5.3)
POTASSIUM SERPL-SCNC: 4.3 MMOL/L — SIGNIFICANT CHANGE UP (ref 3.5–5.3)
PROT SERPL-MCNC: 4.9 G/DL — LOW (ref 6.6–8.7)
PROTHROM AB SERPL-ACNC: 13 SEC — SIGNIFICANT CHANGE UP (ref 9.9–13.4)
RBC # BLD: 3.25 M/UL — LOW (ref 3.8–5.2)
RBC # FLD: 17.5 % — HIGH (ref 10.3–14.5)
SODIUM SERPL-SCNC: 142 MMOL/L — SIGNIFICANT CHANGE UP (ref 135–145)
WBC # BLD: 6.76 K/UL — SIGNIFICANT CHANGE UP (ref 3.8–10.5)
WBC # FLD AUTO: 6.76 K/UL — SIGNIFICANT CHANGE UP (ref 3.8–10.5)

## 2025-05-08 PROCEDURE — 99239 HOSP IP/OBS DSCHRG MGMT >30: CPT

## 2025-05-08 RX ORDER — TRAMADOL HYDROCHLORIDE 50 MG/1
0.5 TABLET, FILM COATED ORAL
Qty: 0 | Refills: 0 | DISCHARGE
Start: 2025-05-08

## 2025-05-08 RX ORDER — POLYETHYLENE GLYCOL 3350 17 G/17G
17 POWDER, FOR SOLUTION ORAL
Qty: 0 | Refills: 0 | DISCHARGE
Start: 2025-05-08

## 2025-05-08 RX ORDER — TAMSULOSIN HYDROCHLORIDE 0.4 MG/1
1 CAPSULE ORAL
Qty: 0 | Refills: 0 | DISCHARGE
Start: 2025-05-08

## 2025-05-08 RX ORDER — NEBIVOLOL 2.5 MG/1
1 TABLET ORAL
Refills: 0 | DISCHARGE

## 2025-05-08 RX ORDER — TRAMADOL HYDROCHLORIDE 50 MG/1
1 TABLET, FILM COATED ORAL
Qty: 0 | Refills: 0 | DISCHARGE
Start: 2025-05-08

## 2025-05-08 RX ORDER — BISACODYL 5 MG
1 TABLET, DELAYED RELEASE (ENTERIC COATED) ORAL
Qty: 0 | Refills: 0 | DISCHARGE
Start: 2025-05-08

## 2025-05-08 RX ORDER — SENNA 187 MG
2 TABLET ORAL
Qty: 0 | Refills: 0 | DISCHARGE
Start: 2025-05-08

## 2025-05-08 RX ADMIN — TIMOLOL MALEATE 1 DROP(S): 6.8 SOLUTION OPHTHALMIC at 05:43

## 2025-05-08 RX ADMIN — APIXABAN 2.5 MILLIGRAM(S): 2.5 TABLET, FILM COATED ORAL at 05:43

## 2025-05-08 RX ADMIN — MUPIROCIN CALCIUM 1 APPLICATION(S): 20 CREAM TOPICAL at 05:43

## 2025-05-08 RX ADMIN — BRIMONIDINE TARTRATE 1 DROP(S): 1.5 SOLUTION/ DROPS OPHTHALMIC at 05:42

## 2025-05-08 RX ADMIN — LISINOPRIL 25 MILLIGRAM(S): 30 TABLET ORAL at 05:43

## 2025-05-08 RX ADMIN — LISINOPRIL 10 MILLIGRAM(S): 5 TABLET ORAL at 05:43

## 2025-05-08 RX ADMIN — Medication 650 MILLIGRAM(S): at 06:59

## 2025-05-08 RX ADMIN — Medication 650 MILLIGRAM(S): at 10:53

## 2025-05-08 RX ADMIN — Medication 650 MILLIGRAM(S): at 05:43

## 2025-05-08 RX ADMIN — POLYETHYLENE GLYCOL 3350 17 GRAM(S): 17 POWDER, FOR SOLUTION ORAL at 10:53

## 2025-05-08 NOTE — PROGRESS NOTE ADULT - SUBJECTIVE AND OBJECTIVE BOX
Hospitalist Progress Note    Chief Complaint:  L Femur Fx    SUBJECTIVE / OVERNIGHT EVENTS:  No events overnight, patient seen at bedside, in NAD, no new complaints today. Patient denies chest pain, SOB, abd pain, N/V, fever, chills, dysuria or any other complaints. All remainder ROS negative.     MEDICATIONS  (STANDING):  atenolol  Tablet 25 milliGRAM(s) Oral daily  brimonidine 0.2% Ophthalmic Solution 1 Drop(s) Right EYE two times a day  ceFAZolin  Injectable. 2000 milliGRAM(s) IV Push once  lisinopril 10 milliGRAM(s) Oral daily  mupirocin 2% Ointment 1 Application(s) Both Nostrils two times a day  polyethylene glycol 3350 17 Gram(s) Oral daily  povidone iodine 10% Nasal Swab 1 Application(s) Both Nostrils once  senna 2 Tablet(s) Oral at bedtime  timolol 0.5% Solution 1 Drop(s) Right EYE two times a day    MEDICATIONS  (PRN):  acetaminophen     Tablet .. 650 milliGRAM(s) Oral every 6 hours PRN Temp greater or equal to 38C (100.4F), Mild Pain (1 - 3)  aluminum hydroxide/magnesium hydroxide/simethicone Suspension 30 milliLiter(s) Oral every 4 hours PRN Dyspepsia  HYDROmorphone  Injectable 0.5 milliGRAM(s) IV Push every 6 hours PRN Severe Pain (7 - 10)  melatonin 3 milliGRAM(s) Oral at bedtime PRN Insomnia  ondansetron Injectable 4 milliGRAM(s) IV Push every 8 hours PRN Nausea and/or Vomiting        I&O's Summary    04 May 2025 07:01  -  05 May 2025 06:53  --------------------------------------------------------  IN: 200 mL / OUT: 200 mL / NET: 0 mL        PHYSICAL EXAM:  Vital Signs Last 24 Hrs  T(C): 36.4 (05 May 2025 04:56), Max: 37.1 (04 May 2025 15:40)  T(F): 97.5 (05 May 2025 04:56), Max: 98.7 (04 May 2025 15:40)  HR: 85 (05 May 2025 04:56) (63 - 85)  BP: 112/69 (05 May 2025 04:56) (112/54 - 128/73)  BP(mean): --  RR: 18 (05 May 2025 04:56) (16 - 18)  SpO2: 95% (05 May 2025 04:56) (91% - 95%)    Parameters below as of 05 May 2025 04:56  Patient On (Oxygen Delivery Method): room air        CONSTITUTIONAL: Well groomed, no apparent distress  HEENT: NCAT, EOMI  RESP: No respiratory distress, CTA b/l  CV: RRR, no peripheral edema  GI: Soft, NT, ND  NEURO: CN II-XII intact  PSYCH: Appropriate insight/judgment; A+O x4    LABS:                        8.2    11.11 )-----------( 147      ( 04 May 2025 23:20 )             27.9     05-04    144  |  115[H]  |  21  ----------------------------<  132[H]  4.7   |  26  |  0.93    Ca    8.5      04 May 2025 03:35  Mg     1.8     05-04    TPro  5.4[L]  /  Alb  2.5[L]  /  TBili  0.8  /  DBili  x   /  AST  51[H]  /  ALT  43  /  AlkPhos  57  05-04    PT/INR - ( 04 May 2025 03:35 )   PT: 12.9 sec;   INR: 1.10 ratio         PTT - ( 04 May 2025 23:20 )  PTT:51.2 sec      Urinalysis Basic - ( 04 May 2025 03:35 )    Color: x / Appearance: x / SG: x / pH: x  Gluc: 132 mg/dL / Ketone: x  / Bili: x / Urobili: x   Blood: x / Protein: x / Nitrite: x   Leuk Esterase: x / RBC: x / WBC x   Sq Epi: x / Non Sq Epi: x / Bacteria: x        CAPILLARY BLOOD GLUCOSE            RADIOLOGY & ADDITIONAL TESTS:  Results Reviewed: Y  Imaging Personally Reviewed: N  Electrocardiogram Personally Reviewed: [email or web address]                                          
Ortho Post Op Check    Name: ELE LUCIO    MR #: [id number]    Procedure: left femur retrograde IMN with ORIF  Surgeon: Kelsi    Pt comfortable without complaints, pain controlled  Denies CP, SOB, N/V, numbness/tingling     General Exam:  Vital Signs Last 24 Hrs  T(C): 37 (05-05-25 @ 19:05), Max: 37 (05-05-25 @ 19:05)  T(F): 98.6 (05-05-25 @ 19:05), Max: 98.6 (05-05-25 @ 19:05)  HR: 85 (05-05-25 @ 19:05) (63 - 132)  BP: 113/68 (05-05-25 @ 19:05) (94/60 - 138/69)  BP(mean): 82 (05-05-25 @ 19:05) (71 - 101)  RR: 15 (05-05-25 @ 19:05) (10 - 19)  SpO2: 96% (05-05-25 @ 19:05) (92% - 99%)    General: Pt Alert and oriented, NAD, controlled pain.  Dressings C/D/I. No bleeding.  Pulses: 2+ dorsalis pedis pulse. Cap refill < 2 sec.  Sensation: Grossly intact to light touch without deficit.  Motor: + EHL/FHL/TA/GS    T(C): 37 (05-05-25 @ 19:05), Max: 37 (05-05-25 @ 19:05)  HR: 85 (05-05-25 @ 19:05) (63 - 132)  BP: 113/68 (05-05-25 @ 19:05) (94/60 - 138/69)  RR: 15 (05-05-25 @ 19:05) (10 - 19)  SpO2: 96% (05-05-25 @ 19:05) (92% - 99%)    A/P: 89yFemale POD#0 s/p left retrograde IMN of femur with ORIF  - Stable  - Pain Control  - DVT ppx: eliquis   - WBAT LLE, PT/OT  - medical management
Patient seen and examined at bedside. comfortable in bed, pain controlled. Denies fever/chills, SOB/chest pain, abdominal pian, numbness/tingling. no complaints at this time.    Vital Signs Last 24 Hrs  T(C): 36.8 (07 May 2025 05:01), Max: 36.8 (06 May 2025 16:07)  T(F): 98.2 (07 May 2025 05:01), Max: 98.3 (06 May 2025 20:42)  HR: 78 (07 May 2025 05:01) (71 - 82)  BP: 133/78 (07 May 2025 05:01) (129/70 - 150/83)  BP(mean): 90 (07 May 2025 00:05) (90 - 90)  RR: 18 (07 May 2025 05:01) (18 - 19)  SpO2: 95% (07 May 2025 05:01) (92% - 95%)    Parameters below as of 07 May 2025 05:01  Patient On (Oxygen Delivery Method): room air    General: NAD  LLE: Gauze/tegaderm dressings C/D/I, distal mepilex dressing C/D/I. Gauze/tegaderm dressings removed, prineo in tact. no erythema, no active drainage. new dressings applied. SILT. + dorsi/plantarflexion. PT 2+, ext warm cap refill brisk. calf soft NT B/L.                          9.6    7.13  )-----------( 143      ( 07 May 2025 04:25 )             29.7     A/P: 89 y.o F s/p left distal femur ORIF with IMN POD #2  - WBAT  - DVTP  - cont care primary team
Patient seen and examined at bedside. comfortable in bed, pain controlled. Denies fever/chills, SOB/chest pain, abdominal pian, numbness/tingling. no complaints at this time.    Vital Signs Last 24 Hrs  T(C): 37.1 (08 May 2025 04:01), Max: 37.1 (08 May 2025 04:01)  T(F): 98.8 (08 May 2025 04:01), Max: 98.8 (08 May 2025 04:01)  HR: 82 (08 May 2025 04:01) (77 - 84)  BP: 160/83 (08 May 2025 04:01) (110/67 - 160/83)  BP(mean): --  RR: 18 (08 May 2025 04:01) (18 - 18)  SpO2: 91% (08 May 2025 04:01) (89% - 96%)    Parameters below as of 08 May 2025 04:01  Patient On (Oxygen Delivery Method): room air        General: NAD  LLE: Dressings C/D/I. SILT. + dorsi/plantarflexion. PT 2+, ext warm cap refill brisk. calf soft NT B/L.                          9.7    6.76  )-----------( 175      ( 08 May 2025 04:33 )             29.9                A/P: 89 y.o F s/p left distal femur ORIF with IMN POD #3  - WBAT  - DVTP  - cont care primary team
[facility] Division of Hospital Medicine  Jc Diaz MD    Chief Complaint:  Patient is a 89y old  Female who presents with a chief complaint of Left distal femur fx (06 May 2025 13:39)      SUBJECTIVE / OVERNIGHT EVENTS:  Patient seen and examined at bedside. No acute events reported overnight. No new complaints.    MEDICATIONS  (STANDING):  acetaminophen     Tablet .. 650 milliGRAM(s) Oral every 8 hours  apixaban 2.5 milliGRAM(s) Oral two times a day  atenolol  Tablet 25 milliGRAM(s) Oral daily  brimonidine 0.2% Ophthalmic Solution 1 Drop(s) Right EYE two times a day  ceFAZolin  Injectable. 2000 milliGRAM(s) IV Push once  lactated ringers. 1000 milliLiter(s) (75 mL/Hr) IV Continuous <Continuous>  lisinopril 10 milliGRAM(s) Oral daily  mupirocin 2% Ointment 1 Application(s) Both Nostrils two times a day  polyethylene glycol 3350 17 Gram(s) Oral daily  senna 2 Tablet(s) Oral at bedtime  tamsulosin 0.4 milliGRAM(s) Oral at bedtime  timolol 0.5% Solution 1 Drop(s) Right EYE two times a day    MEDICATIONS  (PRN):  aluminum hydroxide/magnesium hydroxide/simethicone Suspension 30 milliLiter(s) Oral every 4 hours PRN Dyspepsia  bisacodyl Suppository 10 milliGRAM(s) Rectal daily PRN If no bowel movement  HYDROmorphone  Injectable 0.5 milliGRAM(s) IV Push every 6 hours PRN Severe Pain (7 - 10)  magnesium hydroxide Suspension 30 milliLiter(s) Oral daily PRN Constipation  melatonin 3 milliGRAM(s) Oral at bedtime PRN Insomnia  ondansetron Injectable 4 milliGRAM(s) IV Push every 8 hours PRN Nausea and/or Vomiting  ondansetron Injectable 4 milliGRAM(s) IV Push every 6 hours PRN Nausea and/or Vomiting  oxyCODONE    IR 5 milliGRAM(s) Oral every 4 hours PRN Severe Pain (7 - 10)  traMADol 25 milliGRAM(s) Oral every 4 hours PRN Mild Pain (1 - 3)  traMADol 50 milliGRAM(s) Oral every 4 hours PRN Moderate Pain (4 - 6)        I&O's Summary    06 May 2025 07:01  -  07 May 2025 07:00  --------------------------------------------------------  IN: 2235 mL / OUT: 1000 mL / NET: 1235 mL        PHYSICAL EXAM:  Vital Signs Last 24 Hrs  T(C): 36.7 (07 May 2025 10:39), Max: 36.8 (06 May 2025 16:07)  T(F): 98.1 (07 May 2025 10:39), Max: 98.3 (06 May 2025 20:42)  HR: 77 (07 May 2025 10:39) (71 - 81)  BP: 112/68 (07 May 2025 10:39) (112/68 - 150/83)  BP(mean): 90 (07 May 2025 00:05) (90 - 90)  RR: 18 (07 May 2025 10:39) (18 - 19)  SpO2: 95% (07 May 2025 10:39) (92% - 95%)    Parameters below as of 07 May 2025 10:39  Patient On (Oxygen Delivery Method): room air        CONSTITUTIONAL: NAD  HEENT: NC/AT, PERRL, no JVD  RESPIRATORY: CTA bilaterally, normal effort  CARDIOVASCULAR: RRR, S1/S2+, no m/g/r  ABDOMEN: Nontender to palpation, normoactive bowel sounds, no rebound/guarding  MUSCULOSKELETAL: No edema, cyanosis or deformities.  PSYCH: Calm, affect appropriate.  NEUROLOGY: Awake, alert, no focal neurological deficits.   SKIN: Left thigh c/d/i  VASC: Distal pulses palpable    LABS:                        9.6    7.13  )-----------( 143      ( 07 May 2025 04:25 )             29.7     05-07    140  |  107  |  17.7  ----------------------------<  102[H]  4.2   |  23.0  |  0.55    Ca    8.0[L]      07 May 2025 04:25  Phos  2.1     05-05  Mg     1.5     05-05    TPro  4.5[L]  /  Alb  2.5[L]  /  TBili  1.1  /  DBili  x   /  AST  129[H]  /  ALT  77[H]  /  AlkPhos  192[H]  05-07    PT/INR - ( 07 May 2025 04:25 )   PT: 12.0 sec;   INR: 1.03 ratio               Urinalysis Basic - ( 07 May 2025 04:25 )    Color: x / Appearance: x / SG: x / pH: x  Gluc: 102 mg/dL / Ketone: x  / Bili: x / Urobili: x   Blood: x / Protein: x / Nitrite: x   Leuk Esterase: x / RBC: x / WBC x   Sq Epi: x / Non Sq Epi: x / Bacteria: x        CAPILLARY BLOOD GLUCOSE            RADIOLOGY & ADDITIONAL TESTS:  Results Reviewed:   Imaging Personally Reviewed:  Electrocardiogram Personally Reviewed:                                          
[facility] Division of Hospital Medicine  Jc Diaz MD    Chief Complaint:  Patient is a 89y old  Female who presents with a chief complaint of Left distal femur fx (06 May 2025 07:12)      SUBJECTIVE / OVERNIGHT EVENTS:  Patient seen and examined at bedside. No acute events reported overnight. No new complaints.    MEDICATIONS  (STANDING):  acetaminophen     Tablet .. 650 milliGRAM(s) Oral every 8 hours  apixaban 2.5 milliGRAM(s) Oral two times a day  atenolol  Tablet 25 milliGRAM(s) Oral daily  brimonidine 0.2% Ophthalmic Solution 1 Drop(s) Right EYE two times a day  ceFAZolin  Injectable. 2000 milliGRAM(s) IV Push once  lactated ringers. 1000 milliLiter(s) (75 mL/Hr) IV Continuous <Continuous>  lisinopril 10 milliGRAM(s) Oral daily  mupirocin 2% Ointment 1 Application(s) Both Nostrils two times a day  polyethylene glycol 3350 17 Gram(s) Oral daily  senna 2 Tablet(s) Oral at bedtime  sodium phosphate 15 milliMole(s)/250 mL IVPB 15 milliMole(s) IV Intermittent once  timolol 0.5% Solution 1 Drop(s) Right EYE two times a day    MEDICATIONS  (PRN):  aluminum hydroxide/magnesium hydroxide/simethicone Suspension 30 milliLiter(s) Oral every 4 hours PRN Dyspepsia  HYDROmorphone  Injectable 0.5 milliGRAM(s) IV Push every 6 hours PRN Severe Pain (7 - 10)  magnesium hydroxide Suspension 30 milliLiter(s) Oral daily PRN Constipation  melatonin 3 milliGRAM(s) Oral at bedtime PRN Insomnia  ondansetron Injectable 4 milliGRAM(s) IV Push every 8 hours PRN Nausea and/or Vomiting  ondansetron Injectable 4 milliGRAM(s) IV Push every 6 hours PRN Nausea and/or Vomiting  oxyCODONE    IR 5 milliGRAM(s) Oral every 4 hours PRN Severe Pain (7 - 10)  traMADol 25 milliGRAM(s) Oral every 4 hours PRN Mild Pain (1 - 3)  traMADol 50 milliGRAM(s) Oral every 4 hours PRN Moderate Pain (4 - 6)        I&O's Summary    05 May 2025 07:01  -  06 May 2025 07:00  --------------------------------------------------------  IN: 2873 mL / OUT: 2300 mL / NET: 573 mL        PHYSICAL EXAM:  Vital Signs Last 24 Hrs  T(C): 36.6 (06 May 2025 08:57), Max: 37 (05 May 2025 19:05)  T(F): 97.9 (06 May 2025 08:57), Max: 98.6 (05 May 2025 19:05)  HR: 71 (06 May 2025 08:57) (63 - 132)  BP: 145/80 (06 May 2025 08:57) (94/60 - 145/80)  BP(mean): 82 (05 May 2025 19:05) (71 - 101)  RR: 18 (06 May 2025 08:57) (10 - 19)  SpO2: 93% (06 May 2025 08:57) (92% - 99%)    Parameters below as of 06 May 2025 08:57  Patient On (Oxygen Delivery Method): nasal cannula  O2 Flow (L/min): 1      CONSTITUTIONAL: NAD  HEENT: NC/AT, PERRL, no JVD  RESPIRATORY: CTA bilaterally, normal effort  CARDIOVASCULAR: RRR, S1/S2+, no m/g/r  ABDOMEN: Nontender to palpation, normoactive bowel sounds, no rebound/guarding  MUSCULOSKELETAL: No edema, cyanosis or deformities.  PSYCH: Calm, affect appropriate.  NEUROLOGY: Awake, alert, no focal neurological deficits.   SKIN: Left thigh c/d/i  VASC: Distal pulses palpable    LABS:                        10.1   9.11  )-----------( 125      ( 06 May 2025 04:22 )             29.6     05-06    141  |  109[H]  |  15.5  ----------------------------<  102[H]  4.6   |  22.0  |  0.60    Ca    8.0[L]      06 May 2025 04:22  Phos  2.1     05-05  Mg     1.5     05-05    TPro  5.4[L]  /  Alb  2.9[L]  /  TBili  0.8  /  DBili  x   /  AST  32[H]  /  ALT  34[H]  /  AlkPhos  76  05-05    PT/INR - ( 06 May 2025 04:22 )   PT: 11.5 sec;   INR: 1.02 ratio         PTT - ( 05 May 2025 05:30 )  PTT:29.3 sec      Urinalysis Basic - ( 06 May 2025 04:22 )    Color: x / Appearance: x / SG: x / pH: x  Gluc: 102 mg/dL / Ketone: x  / Bili: x / Urobili: x   Blood: x / Protein: x / Nitrite: x   Leuk Esterase: x / RBC: x / WBC x   Sq Epi: x / Non Sq Epi: x / Bacteria: x        CAPILLARY BLOOD GLUCOSE            RADIOLOGY & ADDITIONAL TESTS:  Results Reviewed:   Imaging Personally Reviewed:  Electrocardiogram Personally Reviewed:                                          
ELE LUCIO    [id number]    History: Patient is status post left distal femur ORIF with IM nail , POD#1.  Patient is doing well. The patient's pain is controlled using the prescribed pain medications. The patient will be participating in physical therapy, WBAT. Denies nausea, vomiting, chest pain, shortness of breath, abdominal pain or fever. No new orthopedic complaints.                          10.1   9.11  )-----------( 125      ( 06 May 2025 04:22 )             29.6       05-06    141  |  109[H]  |  15.5  ----------------------------<  102[H]  4.6   |  22.0  |  0.60    Ca    8.0[L]      06 May 2025 04:22  Phos  2.1     05-05  Mg     1.5     05-05    TPro  5.4[L]  /  Alb  2.9[L]  /  TBili  0.8  /  DBili  x   /  AST  32[H]  /  ALT  34[H]  /  AlkPhos  76  05-05        MEDICATIONS  (STANDING):  acetaminophen     Tablet .. 650 milliGRAM(s) Oral every 8 hours  apixaban 2.5 milliGRAM(s) Oral two times a day  atenolol  Tablet 25 milliGRAM(s) Oral daily  brimonidine 0.2% Ophthalmic Solution 1 Drop(s) Right EYE two times a day  ceFAZolin  Injectable. 2000 milliGRAM(s) IV Push once  lactated ringers. 1000 milliLiter(s) (75 mL/Hr) IV Continuous <Continuous>  lisinopril 10 milliGRAM(s) Oral daily  mupirocin 2% Ointment 1 Application(s) Both Nostrils two times a day  polyethylene glycol 3350 17 Gram(s) Oral daily  senna 2 Tablet(s) Oral at bedtime  sodium phosphate 15 milliMole(s)/250 mL IVPB 15 milliMole(s) IV Intermittent once  timolol 0.5% Solution 1 Drop(s) Right EYE two times a day    MEDICATIONS  (PRN):  aluminum hydroxide/magnesium hydroxide/simethicone Suspension 30 milliLiter(s) Oral every 4 hours PRN Dyspepsia  HYDROmorphone  Injectable 0.5 milliGRAM(s) IV Push every 6 hours PRN Severe Pain (7 - 10)  magnesium hydroxide Suspension 30 milliLiter(s) Oral daily PRN Constipation  melatonin 3 milliGRAM(s) Oral at bedtime PRN Insomnia  ondansetron Injectable 4 milliGRAM(s) IV Push every 8 hours PRN Nausea and/or Vomiting  ondansetron Injectable 4 milliGRAM(s) IV Push every 6 hours PRN Nausea and/or Vomiting  oxyCODONE    IR 5 milliGRAM(s) Oral every 4 hours PRN Severe Pain (7 - 10)  traMADol 25 milliGRAM(s) Oral every 4 hours PRN Mild Pain (1 - 3)  traMADol 50 milliGRAM(s) Oral every 4 hours PRN Moderate Pain (4 - 6)      Physical exam: The left Leg  dressing remains clean, dry and intact. No drainage or discharge. No erythema is noted. No blistering. No ecchymosis. The calf is supple nontender. Passive range of motion is acceptable to due postoperative pain. No calf tenderness. Sensation to light touch is grossly intact distally. Motor function distally is 5/5. No foot drop. 2+ dorsalis pedis pulse. Capillary refill is less than 2 seconds. No cyanosis.    Primary Orthopedic Assessment:  • s/p LEFT distal femur ORIF with IM mikaela    Secondary  Orthopedic Assessment(s):   •     Secondary  Medical Assessment(s):   •     Plan:   • DVT prophylaxis as prescribed, including use of compression devices and ankle pumps / Eliquis 2.5 mg BID  • Continue physical therapy, WBAT   • Weightbearing as tolerated of the Left lower extremity with assistance of a walker  • Incentive spirometry encouraged  • Pain control as clinically indicated

## 2025-05-15 LAB — SURGICAL PATHOLOGY STUDY: SIGNIFICANT CHANGE UP

## 2025-05-21 ENCOUNTER — APPOINTMENT (OUTPATIENT)
Dept: ORTHOPEDIC SURGERY | Facility: CLINIC | Age: 89
End: 2025-05-21
Payer: MEDICARE

## 2025-05-21 DIAGNOSIS — M84.452D PATHOLOGICAL FRACTURE, LEFT FEMUR, SUBSEQUENT ENCOUNTER FOR FRACTURE WITH ROUTINE HEALING: ICD-10-CM

## 2025-05-21 PROCEDURE — 99024 POSTOP FOLLOW-UP VISIT: CPT

## 2025-05-21 PROCEDURE — 73552 X-RAY EXAM OF FEMUR 2/>: CPT | Mod: TC

## 2025-06-02 PROCEDURE — 85025 COMPLETE CBC W/AUTO DIFF WBC: CPT

## 2025-06-02 PROCEDURE — 84100 ASSAY OF PHOSPHORUS: CPT

## 2025-06-02 PROCEDURE — P9045: CPT

## 2025-06-02 PROCEDURE — 86923 COMPATIBILITY TEST ELECTRIC: CPT

## 2025-06-02 PROCEDURE — 93005 ELECTROCARDIOGRAM TRACING: CPT

## 2025-06-02 PROCEDURE — P9016: CPT

## 2025-06-02 PROCEDURE — 80048 BASIC METABOLIC PNL TOTAL CA: CPT

## 2025-06-02 PROCEDURE — 85027 COMPLETE CBC AUTOMATED: CPT

## 2025-06-02 PROCEDURE — 88311 DECALCIFY TISSUE: CPT

## 2025-06-02 PROCEDURE — 36415 COLL VENOUS BLD VENIPUNCTURE: CPT

## 2025-06-02 PROCEDURE — 86900 BLOOD TYPING SEROLOGIC ABO: CPT

## 2025-06-02 PROCEDURE — P9040: CPT

## 2025-06-02 PROCEDURE — C9399: CPT

## 2025-06-02 PROCEDURE — 73552 X-RAY EXAM OF FEMUR 2/>: CPT

## 2025-06-02 PROCEDURE — 80053 COMPREHEN METABOLIC PANEL: CPT

## 2025-06-02 PROCEDURE — 76000 FLUOROSCOPY <1 HR PHYS/QHP: CPT

## 2025-06-02 PROCEDURE — 36430 TRANSFUSION BLD/BLD COMPNT: CPT

## 2025-06-02 PROCEDURE — 88305 TISSUE EXAM BY PATHOLOGIST: CPT

## 2025-06-02 PROCEDURE — 86850 RBC ANTIBODY SCREEN: CPT

## 2025-06-02 PROCEDURE — 85610 PROTHROMBIN TIME: CPT

## 2025-06-02 PROCEDURE — 93970 EXTREMITY STUDY: CPT

## 2025-06-02 PROCEDURE — 86901 BLOOD TYPING SEROLOGIC RH(D): CPT

## 2025-06-02 PROCEDURE — 85730 THROMBOPLASTIN TIME PARTIAL: CPT

## 2025-06-02 PROCEDURE — C1713: CPT

## 2025-06-02 PROCEDURE — 83735 ASSAY OF MAGNESIUM: CPT

## 2025-08-04 ENCOUNTER — NON-APPOINTMENT (OUTPATIENT)
Age: 89
End: 2025-08-04

## 2025-08-05 ENCOUNTER — NON-APPOINTMENT (OUTPATIENT)
Age: 89
End: 2025-08-05

## 2025-08-05 ENCOUNTER — APPOINTMENT (OUTPATIENT)
Dept: OPHTHALMOLOGY | Facility: CLINIC | Age: 89
End: 2025-08-05
Payer: MEDICARE

## 2025-08-05 PROCEDURE — 92012 INTRM OPH EXAM EST PATIENT: CPT

## (undated) DEVICE — TOURNIQUET ESMARK 6"

## (undated) DEVICE — DRAPE EXTREMITY 87" X 106" X 128"

## (undated) DEVICE — DRAPE C ARM C-ARMOUR

## (undated) DEVICE — DRAPE SPLIT SHEET 77" X 108"

## (undated) DEVICE — GLV 7.5 PROTEXIS (WHITE)

## (undated) DEVICE — SOL IRR POUR H2O 1000ML

## (undated) DEVICE — PACK EXTREMITY

## (undated) DEVICE — DRAPE C ARM UNIVERSAL

## (undated) DEVICE — DRAPE 1/2 SHEET 40X57"

## (undated) DEVICE — SUT NYLON 3-0 18" PS-2

## (undated) DEVICE — SUT VICRYL PLUS 0 27" CT-2 UNDYED

## (undated) DEVICE — DRAPE MAYO STAND 23"

## (undated) DEVICE — FRAZIER SUCTION TIP 10FR

## (undated) DEVICE — GLV 8 PROTEXIS (BLUE)

## (undated) DEVICE — SUT MONOCRYL 2-0 27" SH UNDYED

## (undated) DEVICE — SOL IRR POUR NS 0.9% 1000ML

## (undated) DEVICE — SYNTHES REAMING ROD WITH BALL TIP 2.5MM 950MM

## (undated) DEVICE — DRSG COBAN 6"

## (undated) DEVICE — DRAPE TOWEL BLUE 17" X 24"

## (undated) DEVICE — VENODYNE/SCD SLEEVE CALF MEDIUM

## (undated) DEVICE — WARMING BLANKET UPPER ADULT

## (undated) DEVICE — FOLEY TRAY 16FR 5CC LF UMETER CLOSED

## (undated) DEVICE — SUT MONOCRYL 3-0 27" PS-2 UNDYED

## (undated) DEVICE — DRAPE IOBAN 33" X 23"